# Patient Record
Sex: FEMALE | Race: OTHER | Employment: UNEMPLOYED | URBAN - METROPOLITAN AREA
[De-identification: names, ages, dates, MRNs, and addresses within clinical notes are randomized per-mention and may not be internally consistent; named-entity substitution may affect disease eponyms.]

---

## 2017-01-30 ENCOUNTER — TELEPHONE (OUTPATIENT)
Dept: INTERNAL MEDICINE CLINIC | Facility: CLINIC | Age: 57
End: 2017-01-30

## 2017-01-30 NOTE — TELEPHONE ENCOUNTER
Patient stated that has left sided lower abdominal pain on and off. Last time had the abdominal pain was 2-3 days. Not having any abdominal pain currently. No nausea. No vomiting. No diarrhea. No fevers.  Patient stated that for the past 1 month has been fe

## 2017-02-01 ENCOUNTER — OFFICE VISIT (OUTPATIENT)
Dept: INTERNAL MEDICINE CLINIC | Facility: CLINIC | Age: 57
End: 2017-02-01

## 2017-02-01 VITALS
RESPIRATION RATE: 20 BRPM | BODY MASS INDEX: 30.82 KG/M2 | DIASTOLIC BLOOD PRESSURE: 81 MMHG | HEIGHT: 65 IN | HEART RATE: 63 BPM | SYSTOLIC BLOOD PRESSURE: 121 MMHG | OXYGEN SATURATION: 99 % | TEMPERATURE: 98 F | WEIGHT: 185 LBS

## 2017-02-01 DIAGNOSIS — R10.2 PELVIC PAIN IN FEMALE: Primary | ICD-10-CM

## 2017-02-01 DIAGNOSIS — N81.11 CYSTOCELE, MIDLINE: ICD-10-CM

## 2017-02-01 PROCEDURE — 99212 OFFICE O/P EST SF 10 MIN: CPT | Performed by: INTERNAL MEDICINE

## 2017-02-01 PROCEDURE — 99214 OFFICE O/P EST MOD 30 MIN: CPT | Performed by: INTERNAL MEDICINE

## 2017-02-01 NOTE — ASSESSMENT & PLAN NOTE
Very mild cystocele midline. No uterine descent on palpation. No other palpable abnormalities. Urethra seems normal.  No other perineal abnormalities on visual examination. No intervention needed at this time. Minoo's exercises discussed.   Drink plent

## 2017-02-01 NOTE — PROGRESS NOTES
HPI:    Patient ID: Maryellen Tsang is a 55year old female. Abdominal Pain  This is a new problem. The current episode started more than 1 month ago. The problem occurs constantly.  The problem has been waxing and waning (fullness in the lower abdominal a %, not currently breastfeeding. Body mass index is 30.79 kg/(m^2).    Wt Readings from Last 6 Encounters:  02/01/17 : 185 lb (83.915 kg)  09/14/16 : 177 lb (80.287 kg)  07/25/16 : 181 lb (82.101 kg)  11/30/15 : 188 lb (85.276 kg)  02/04/15 : 181 lb 11.2 oz palpation. No other palpable abnormalities. Urethra seems normal.  No other perineal abnormalities on visual examination. No intervention needed at this time. Minoo's exercises discussed. Drink plenty of fluids and empty the bladder frequently.   Manda Santos

## 2017-02-01 NOTE — ASSESSMENT & PLAN NOTE
Left lower quadrant discomfort and fullness–no pain, cramping or change in bowel movements.   Appetite has been normal.  Weight has been normal.  Ultrasound of the uterus and ovaries has been ordered and will follow-up after completion

## 2017-02-01 NOTE — PATIENT INSTRUCTIONS
Problem List Items Addressed This Visit        Unprioritized    Cystocele, midline     Very mild cystocele midline. No uterine descent on palpation. No other palpable abnormalities.   Urethra seems normal.  No other perineal abnormalities on visual examin

## 2017-05-17 ENCOUNTER — HOSPITAL ENCOUNTER (OUTPATIENT)
Dept: ULTRASOUND IMAGING | Facility: HOSPITAL | Age: 57
Discharge: HOME | End: 2017-05-17
Attending: INTERNAL MEDICINE
Payer: COMMERCIAL

## 2017-05-17 DIAGNOSIS — R10.2 PELVIC PAIN IN FEMALE: ICD-10-CM

## 2017-05-17 PROCEDURE — 76856 US EXAM PELVIC COMPLETE: CPT | Performed by: INTERNAL MEDICINE

## 2017-05-17 PROCEDURE — 76830 TRANSVAGINAL US NON-OB: CPT | Performed by: INTERNAL MEDICINE

## 2017-06-13 ENCOUNTER — APPOINTMENT (OUTPATIENT)
Dept: LAB | Facility: HOSPITAL | Age: 57
End: 2017-06-13
Attending: INTERNAL MEDICINE
Payer: COMMERCIAL

## 2017-06-13 PROCEDURE — 83036 HEMOGLOBIN GLYCOSYLATED A1C: CPT | Performed by: INTERNAL MEDICINE

## 2017-06-13 PROCEDURE — 80053 COMPREHEN METABOLIC PANEL: CPT | Performed by: INTERNAL MEDICINE

## 2017-06-13 PROCEDURE — 80061 LIPID PANEL: CPT | Performed by: INTERNAL MEDICINE

## 2017-06-27 ENCOUNTER — TELEPHONE (OUTPATIENT)
Dept: INTERNAL MEDICINE CLINIC | Facility: CLINIC | Age: 57
End: 2017-06-27

## 2017-06-28 NOTE — TELEPHONE ENCOUNTER
Reviewed 6/13/17 lab results with pt along with Dr Elaine Lowry recommendations. Pt verbalized understanding and agreed with plan.

## 2017-06-28 NOTE — TELEPHONE ENCOUNTER
Notes Recorded by Colleen Ryan MD on 6/27/2017 at 9:23 PM CDT  Cholesterol panel looks stable. Mild diabetes–the hemoglobin A1c is at 6.1, please watch the diet for sugars and starches as well as desserts.   Kidney functions, liver functions and electrol

## 2017-10-02 ENCOUNTER — OFFICE VISIT (OUTPATIENT)
Dept: INTERNAL MEDICINE CLINIC | Facility: CLINIC | Age: 57
End: 2017-10-02

## 2017-10-02 VITALS
WEIGHT: 184 LBS | DIASTOLIC BLOOD PRESSURE: 68 MMHG | SYSTOLIC BLOOD PRESSURE: 105 MMHG | RESPIRATION RATE: 16 BRPM | BODY MASS INDEX: 30.66 KG/M2 | HEART RATE: 60 BPM | HEIGHT: 65 IN

## 2017-10-02 DIAGNOSIS — Z12.11 COLON CANCER SCREENING: ICD-10-CM

## 2017-10-02 DIAGNOSIS — K29.70 GASTRITIS WITHOUT BLEEDING, UNSPECIFIED CHRONICITY, UNSPECIFIED GASTRITIS TYPE: ICD-10-CM

## 2017-10-02 DIAGNOSIS — R73.9 ELEVATED BLOOD SUGAR: ICD-10-CM

## 2017-10-02 DIAGNOSIS — Z12.31 VISIT FOR SCREENING MAMMOGRAM: ICD-10-CM

## 2017-10-02 DIAGNOSIS — E78.5 HYPERLIPIDEMIA, UNSPECIFIED HYPERLIPIDEMIA TYPE: Primary | ICD-10-CM

## 2017-10-02 DIAGNOSIS — E55.9 VITAMIN D DEFICIENCY: ICD-10-CM

## 2017-10-02 PROBLEM — R10.2 PELVIC PAIN IN FEMALE: Status: RESOLVED | Noted: 2017-02-01 | Resolved: 2017-10-02

## 2017-10-02 PROCEDURE — 99214 OFFICE O/P EST MOD 30 MIN: CPT | Performed by: INTERNAL MEDICINE

## 2017-10-02 PROCEDURE — 90471 IMMUNIZATION ADMIN: CPT | Performed by: INTERNAL MEDICINE

## 2017-10-02 PROCEDURE — 90686 IIV4 VACC NO PRSV 0.5 ML IM: CPT | Performed by: INTERNAL MEDICINE

## 2017-10-02 PROCEDURE — 99212 OFFICE O/P EST SF 10 MIN: CPT | Performed by: INTERNAL MEDICINE

## 2017-10-02 RX ORDER — PANTOPRAZOLE SODIUM 40 MG/1
40 TABLET, DELAYED RELEASE ORAL
Qty: 30 TABLET | Refills: 0 | Status: SHIPPED | OUTPATIENT
Start: 2017-10-02 | End: 2017-12-05

## 2017-10-02 NOTE — ASSESSMENT & PLAN NOTE
Epigastric burning with reflux symptoms with eating spicy foods. Advised patient to avoid spicy foods and eat small meals. Pantoprazole 40 mg once daily for 4 weeks.   If any persistence will need gastric enterology eval.

## 2017-10-02 NOTE — PROGRESS NOTES
HPI:    Patient ID: Pura Zeng is a 65year old female. Colonoscopy,5 Years due on 06/01/2017    GI PROCEDURE     DATE:  6/15/12     PROCEDURE: Colonoscopy. SURGEON:   Darlene Cooper MD     PREOPERATIVE DIAGNOSIS:  1.  Family history of colon total) by mouth every morning before breakfast. Disp: 30 tablet Rfl: 0   aspirin (KP ASPIRIN) 81 MG Oral Tab EC Take 81 mg by mouth daily.    Disp:  Rfl:      Allergies:No Known Allergies      10/02/17  1040   BP: 105/68   Pulse: 60   Resp: 16     Body mass reflux symptoms with eating spicy foods. Advised patient to avoid spicy foods and eat small meals. Pantoprazole 40 mg once daily for 4 weeks.   If any persistence will need gastric enterology eval.         Hyperlipidemia - Primary     Lipid panel has impr

## 2017-10-02 NOTE — PATIENT INSTRUCTIONS
Problem List Items Addressed This Visit        Unprioritized    Elevated blood sugar     Hemoglobin A1c is elevated in spite of diet and exercise. Recheck labs have been ordered.          Relevant Orders    HEMOGLOBIN A1C    Gastritis     Epigastric burnin

## 2017-11-20 ENCOUNTER — LAB ENCOUNTER (OUTPATIENT)
Dept: LAB | Facility: HOSPITAL | Age: 57
End: 2017-11-20
Attending: INTERNAL MEDICINE
Payer: COMMERCIAL

## 2017-11-20 DIAGNOSIS — R73.9 ELEVATED BLOOD SUGAR: ICD-10-CM

## 2017-11-20 DIAGNOSIS — E78.5 HYPERLIPIDEMIA, UNSPECIFIED HYPERLIPIDEMIA TYPE: ICD-10-CM

## 2017-11-20 DIAGNOSIS — E55.9 VITAMIN D DEFICIENCY: ICD-10-CM

## 2017-11-20 PROCEDURE — 83036 HEMOGLOBIN GLYCOSYLATED A1C: CPT

## 2017-11-20 PROCEDURE — 80053 COMPREHEN METABOLIC PANEL: CPT

## 2017-11-20 PROCEDURE — 80061 LIPID PANEL: CPT

## 2017-11-20 PROCEDURE — 82607 VITAMIN B-12: CPT

## 2017-11-20 PROCEDURE — 82306 VITAMIN D 25 HYDROXY: CPT

## 2017-11-20 PROCEDURE — 84443 ASSAY THYROID STIM HORMONE: CPT

## 2017-11-20 PROCEDURE — 36415 COLL VENOUS BLD VENIPUNCTURE: CPT

## 2017-11-20 PROCEDURE — 85025 COMPLETE CBC W/AUTO DIFF WBC: CPT

## 2017-11-30 ENCOUNTER — TELEPHONE (OUTPATIENT)
Dept: GASTROENTEROLOGY | Facility: CLINIC | Age: 57
End: 2017-11-30

## 2017-11-30 ENCOUNTER — TELEPHONE (OUTPATIENT)
Dept: INTERNAL MEDICINE CLINIC | Facility: CLINIC | Age: 57
End: 2017-11-30

## 2017-11-30 NOTE — TELEPHONE ENCOUNTER
Last Procedure:  6/15/2012  Last Diagnosis:  Family history of colon cancer  Recalled for (years): 5 years  Sedation used previously:  IV sedation  Last Prep Used (if known):  Miralax prep  Quality of prep (if known):  Prep was excellent  Anticoagulants/Di

## 2017-11-30 NOTE — TELEPHONE ENCOUNTER
Chart, medications, and recent visits with Dr. Jeff Jolly reviewed.  Ok to schedule colonoscopy dx:  FH colon cancer, split dose miralax prep, MAC or conscious sedations

## 2017-12-05 ENCOUNTER — TELEPHONE (OUTPATIENT)
Dept: GASTROENTEROLOGY | Facility: CLINIC | Age: 57
End: 2017-12-05

## 2017-12-05 DIAGNOSIS — Z80.0 FAMILY HISTORY OF MALIGNANT NEOPLASM OF GASTROINTESTINAL TRACT: Primary | ICD-10-CM

## 2017-12-05 RX ORDER — PANTOPRAZOLE SODIUM 40 MG/1
TABLET, DELAYED RELEASE ORAL
Qty: 90 TABLET | Refills: 0 | Status: SHIPPED | OUTPATIENT
Start: 2017-12-05 | End: 2018-07-12 | Stop reason: ALTCHOICE

## 2017-12-05 NOTE — TELEPHONE ENCOUNTER
Refill Protocol Appointment Criteria  · Appointment scheduled in the past 12 months or in the next 3 months  Recent Outpatient Visits            2 months ago Hyperlipidemia, unspecified hyperlipidemia type    [facility], [facility], [address] [facility] [address], [address] [place] [address],[address]

## 2017-12-05 NOTE — TELEPHONE ENCOUNTER
Parish Zamudio RN          11/30/17 10:53 AM   Note      Last Procedure:  6/15/2012  Last Diagnosis:  Family history of colon cancer  Recalled for (years): 5 years  Sedation used previously:  IV sedation  Last Prep Used (if known):  Miralax prep  Quality

## 2017-12-07 ENCOUNTER — HOSPITAL ENCOUNTER (OUTPATIENT)
Dept: MAMMOGRAPHY | Age: 57
Discharge: HOME | End: 2017-12-07
Attending: INTERNAL MEDICINE
Payer: COMMERCIAL

## 2017-12-07 DIAGNOSIS — Z12.31 VISIT FOR SCREENING MAMMOGRAM: ICD-10-CM

## 2017-12-07 PROCEDURE — 77067 SCR MAMMO BI INCL CAD: CPT | Performed by: INTERNAL MEDICINE

## 2017-12-07 NOTE — TELEPHONE ENCOUNTER
OK to schedule colonoscopy , dx FH colon cancer, also EGD , dx : epigastric pain ( please check with patient if she still has , based on her visit with Dr. Fernando Garcia in October); Slit dose miralax or trilyte prep, IV or mac, [facility] ok.

## 2017-12-12 ENCOUNTER — TELEPHONE (OUTPATIENT)
Dept: GASTROENTEROLOGY | Facility: CLINIC | Age: 57
End: 2017-12-12

## 2017-12-12 NOTE — TELEPHONE ENCOUNTER
Scheduled for:  Colonoscopy 22400  Provider Name: Dr. Perry Rivas  Date:  1/16/18  Location:  [address] [place] [address]  Sedation:  MAC  Time:  1400 (pt is aware to arrive at 1300)   Prep:  2 day Miralax/Gatorade, mailed 12/12/17  Meds/Allergies Reconciled?:  Physician reviewed   Shelbi

## 2017-12-12 NOTE — TELEPHONE ENCOUNTER
Routed to schedulers as this was JUST scheduled today. See TE from 12/5/17 for orders to reschedule, thank you.

## 2017-12-12 NOTE — TELEPHONE ENCOUNTER
Pt cancelling cln/procedure scheduled on 1/16/18 due to  working and her not having a .  Pt requesting a cb to reschedule, pls call at:[phone]  *Pt informed about 72 hr cb

## 2017-12-12 NOTE — TELEPHONE ENCOUNTER
Pt states she is not having any further upper GI symptoms.  She does not wish to have an upper endoscopy

## 2017-12-13 ENCOUNTER — TELEPHONE (OUTPATIENT)
Dept: GASTROENTEROLOGY | Facility: CLINIC | Age: 57
End: 2017-12-13

## 2017-12-16 NOTE — TELEPHONE ENCOUNTER
Notes Recorded by Nitza Aguirre MD on 12/15/2017 at 8:18 PM CST  The vitamin D levels are quite low–please advised to start on vitamin D at 50,000 units once a week for 12 weeks, the prescription has been sent into the pharmacy  Diabetes test–hemoglobin A

## 2017-12-18 NOTE — TELEPHONE ENCOUNTER
Spoke with patient (identified name and ), results reviewed and agrees with plan.   Lab results mailed to the patient per patient request.

## 2018-01-16 ENCOUNTER — SURGERY (OUTPATIENT)
Age: 58
End: 2018-01-16

## 2018-01-16 ENCOUNTER — ANESTHESIA (OUTPATIENT)
Dept: ENDOSCOPY | Age: 58
End: 2018-01-16
Payer: COMMERCIAL

## 2018-01-16 ENCOUNTER — ANESTHESIA EVENT (OUTPATIENT)
Dept: ENDOSCOPY | Age: 58
End: 2018-01-16
Payer: COMMERCIAL

## 2018-01-16 ENCOUNTER — HOSPITAL ENCOUNTER (OUTPATIENT)
Age: 58
Setting detail: HOSPITAL OUTPATIENT SURGERY
Discharge: HOME | End: 2018-01-16
Attending: INTERNAL MEDICINE | Admitting: INTERNAL MEDICINE
Payer: COMMERCIAL

## 2018-01-16 DIAGNOSIS — Z80.0 FAMILY HISTORY OF MALIGNANT NEOPLASM OF GASTROINTESTINAL TRACT: ICD-10-CM

## 2018-01-16 PROBLEM — K64.8 INTERNAL HEMORRHOIDS: Status: ACTIVE | Noted: 2018-01-16

## 2018-01-16 PROBLEM — K57.30 DIVERTICULOSIS LARGE INTESTINE W/O PERFORATION OR ABSCESS W/O BLEEDING: Status: ACTIVE | Noted: 2018-01-16

## 2018-01-16 PROCEDURE — 45378 DIAGNOSTIC COLONOSCOPY: CPT | Performed by: INTERNAL MEDICINE

## 2018-01-16 RX ORDER — SODIUM CHLORIDE, SODIUM LACTATE, POTASSIUM CHLORIDE, CALCIUM CHLORIDE 600; 310; 30; 20 MG/100ML; MG/100ML; MG/100ML; MG/100ML
INJECTION, SOLUTION INTRAVENOUS CONTINUOUS PRN
Status: DISCONTINUED | OUTPATIENT
Start: 2018-01-16 | End: 2018-01-16 | Stop reason: SURG

## 2018-01-16 RX ORDER — LIDOCAINE HYDROCHLORIDE 10 MG/ML
INJECTION, SOLUTION EPIDURAL; INFILTRATION; INTRACAUDAL; PERINEURAL AS NEEDED
Status: DISCONTINUED | OUTPATIENT
Start: 2018-01-16 | End: 2018-01-16 | Stop reason: SURG

## 2018-01-16 RX ADMIN — LIDOCAINE HYDROCHLORIDE 25 MG: 10 INJECTION, SOLUTION EPIDURAL; INFILTRATION; INTRACAUDAL; PERINEURAL at 14:39:00

## 2018-01-16 RX ADMIN — SODIUM CHLORIDE, SODIUM LACTATE, POTASSIUM CHLORIDE, CALCIUM CHLORIDE: 600; 310; 30; 20 INJECTION, SOLUTION INTRAVENOUS at 14:38:00

## 2018-01-16 RX ADMIN — SODIUM CHLORIDE, SODIUM LACTATE, POTASSIUM CHLORIDE, CALCIUM CHLORIDE: 600; 310; 30; 20 INJECTION, SOLUTION INTRAVENOUS at 14:55:00

## 2018-01-16 NOTE — BRIEF OP NOTE
Pre-Operative Diagnosis: Family history of malignant neoplasm of gastrointestinal tract      Post-Operative Diagnosis: Diverticulosis, internal hemorrhoids     Procedure Performed:   Procedure(s):  COLONOSCOPY    Surgeon(s) and Role:     * [facility] Geoffrey

## 2018-01-16 NOTE — OPERATIVE REPORT
[facility]    PATIENT'S NAME: Kati Tatiana   ATTENDING PHYSICIAN: Charlotte Perez MD   OPERATING PHYSICIAN: Charlotte Perez MD   PATIENT ACCOUNT#:   [id number]    LOCATION:  [address],[facility] 1 ENDO POOL ROOMS 6 [facility] [address]  [id number]  [facility]/

## 2018-01-16 NOTE — H&P
History & Physical Examination    Patient Name: Arie Nunez  MRN: [id number]  [facility]: [id number]  YOB: 1960    Diagnosis: Family history of colon cancer      Prescriptions Prior to Admission:  PANTOPRAZOLE SODIUM 40 MG Oral Tab EC TAKE ONE TAB

## 2018-01-16 NOTE — ANESTHESIA PREPROCEDURE EVALUATION
Anesthesia PreOp Note    HPI:     Jimmy Lomeli is a 65year old female who presents for preoperative consultation requested by: Jean Carlos Cifuentes MD    Date of Surgery: 1/16/2018    Procedure(s):  COLONOSCOPY  Indication: Family history of malig Spouse name: N/A    Years of education: N/A  Number of children: N/A     Occupational History  None on file     Social History Main Topics   Smoking status: Never Smoker    Smokeless tobacco: Never Used    Alcohol use No    Drug use: No    Sexual activity: the patient's questions were answered to the best of my ability. The patient desires the anesthetic management as planned.   Ajith LEAL  1/16/2018 2:35 PM

## 2018-01-17 VITALS
WEIGHT: 185 LBS | DIASTOLIC BLOOD PRESSURE: 82 MMHG | SYSTOLIC BLOOD PRESSURE: 138 MMHG | HEART RATE: 56 BPM | HEIGHT: 66 IN | RESPIRATION RATE: 19 BRPM | BODY MASS INDEX: 29.73 KG/M2 | OXYGEN SATURATION: 98 %

## 2018-01-18 NOTE — TELEPHONE ENCOUNTER
Entered into EPIC:Recall colon in 5 years per . Last Colon done 1/16/18, next due 1/16/23. Snapshot updated.

## 2018-07-12 ENCOUNTER — OFFICE VISIT (OUTPATIENT)
Dept: INTERNAL MEDICINE CLINIC | Facility: CLINIC | Age: 58
End: 2018-07-12

## 2018-07-12 VITALS
SYSTOLIC BLOOD PRESSURE: 125 MMHG | DIASTOLIC BLOOD PRESSURE: 79 MMHG | TEMPERATURE: 98 F | RESPIRATION RATE: 18 BRPM | HEART RATE: 60 BPM | HEIGHT: 66 IN | BODY MASS INDEX: 29.7 KG/M2 | WEIGHT: 184.81 LBS

## 2018-07-12 DIAGNOSIS — E55.9 VITAMIN D DEFICIENCY: ICD-10-CM

## 2018-07-12 DIAGNOSIS — E78.5 HYPERLIPIDEMIA, UNSPECIFIED HYPERLIPIDEMIA TYPE: Primary | ICD-10-CM

## 2018-07-12 DIAGNOSIS — R73.9 ELEVATED BLOOD SUGAR: ICD-10-CM

## 2018-07-12 PROCEDURE — 99212 OFFICE O/P EST SF 10 MIN: CPT | Performed by: INTERNAL MEDICINE

## 2018-07-12 PROCEDURE — 99214 OFFICE O/P EST MOD 30 MIN: CPT | Performed by: INTERNAL MEDICINE

## 2018-07-12 NOTE — PROGRESS NOTES
HPI:    Patient ID: Maria Victoria Reynolds is a 65year old female.     Operative Report     Rachael Hamilton MD   Gastroenterology        [facility]       PATIENT'S NAME: Saritha Berg PHYSICIAN: Kristen Licona MD  [facility] Recent lipid tests were reviewed and are variable. Exacerbating diseases include obesity. Factors aggravating her hyperlipidemia include fatty foods. Pertinent negatives include no chest pain, myalgias or shortness of breath.  Current antihyperlipidemic lulu rhythm, normal heart sounds and intact distal pulses. No murmur heard. Pulmonary/Chest: Effort normal and breath sounds normal. She has no wheezes. She has no rales. She exhibits no tenderness. Abdominal: Soft.  Bowel sounds are normal. She exhibits n

## 2018-07-12 NOTE — ASSESSMENT & PLAN NOTE
Lipid panel and liver function tests have been stable on diet control alone. Due for recheck labs which have been ordered today.

## 2018-07-12 NOTE — PATIENT INSTRUCTIONS
Problem List Items Addressed This Visit        Unprioritized    Elevated blood sugar     Hemoglobin A1c have been stable on diet alone, she is overdue for labs–ordered today         Relevant Orders    HEMOGLOBIN A1C    Hyperlipidemia - Primary     Lipid pa

## 2018-12-06 ENCOUNTER — APPOINTMENT (OUTPATIENT)
Dept: LAB | Facility: HOSPITAL | Age: 58
End: 2018-12-06
Attending: INTERNAL MEDICINE
Payer: COMMERCIAL

## 2018-12-06 DIAGNOSIS — E55.9 VITAMIN D DEFICIENCY: ICD-10-CM

## 2018-12-06 DIAGNOSIS — E78.5 HYPERLIPIDEMIA, UNSPECIFIED HYPERLIPIDEMIA TYPE: ICD-10-CM

## 2018-12-06 DIAGNOSIS — R73.9 ELEVATED BLOOD SUGAR: ICD-10-CM

## 2018-12-06 PROCEDURE — 36415 COLL VENOUS BLD VENIPUNCTURE: CPT

## 2018-12-06 PROCEDURE — 80053 COMPREHEN METABOLIC PANEL: CPT

## 2018-12-06 PROCEDURE — 80061 LIPID PANEL: CPT

## 2018-12-06 PROCEDURE — 82306 VITAMIN D 25 HYDROXY: CPT

## 2018-12-06 PROCEDURE — 83036 HEMOGLOBIN GLYCOSYLATED A1C: CPT

## 2018-12-14 ENCOUNTER — OFFICE VISIT (OUTPATIENT)
Dept: INTERNAL MEDICINE CLINIC | Facility: CLINIC | Age: 58
End: 2018-12-14

## 2018-12-14 VITALS
WEIGHT: 184 LBS | SYSTOLIC BLOOD PRESSURE: 168 MMHG | DIASTOLIC BLOOD PRESSURE: 103 MMHG | HEIGHT: 66 IN | RESPIRATION RATE: 16 BRPM | BODY MASS INDEX: 29.57 KG/M2 | HEART RATE: 71 BPM

## 2018-12-14 DIAGNOSIS — R73.9 ELEVATED BLOOD SUGAR: ICD-10-CM

## 2018-12-14 DIAGNOSIS — Z23 NEED FOR VACCINATION: ICD-10-CM

## 2018-12-14 DIAGNOSIS — E55.9 VITAMIN D DEFICIENCY: ICD-10-CM

## 2018-12-14 DIAGNOSIS — Z87.898 HISTORY OF SEIZURES: ICD-10-CM

## 2018-12-14 DIAGNOSIS — Z00.00 ROUTINE GENERAL MEDICAL EXAMINATION AT A HEALTH CARE FACILITY: Primary | ICD-10-CM

## 2018-12-14 DIAGNOSIS — Z12.31 VISIT FOR SCREENING MAMMOGRAM: ICD-10-CM

## 2018-12-14 DIAGNOSIS — R55 SYNCOPE, UNSPECIFIED SYNCOPE TYPE: ICD-10-CM

## 2018-12-14 DIAGNOSIS — Z78.0 MENOPAUSE: ICD-10-CM

## 2018-12-14 DIAGNOSIS — E78.5 HYPERLIPIDEMIA, UNSPECIFIED HYPERLIPIDEMIA TYPE: ICD-10-CM

## 2018-12-14 PROCEDURE — 99212 OFFICE O/P EST SF 10 MIN: CPT | Performed by: INTERNAL MEDICINE

## 2018-12-14 PROCEDURE — 90471 IMMUNIZATION ADMIN: CPT | Performed by: INTERNAL MEDICINE

## 2018-12-14 PROCEDURE — 99396 PREV VISIT EST AGE 40-64: CPT | Performed by: INTERNAL MEDICINE

## 2018-12-14 PROCEDURE — 90686 IIV4 VACC NO PRSV 0.5 ML IM: CPT | Performed by: INTERNAL MEDICINE

## 2018-12-14 PROCEDURE — 99213 OFFICE O/P EST LOW 20 MIN: CPT | Performed by: INTERNAL MEDICINE

## 2018-12-14 RX ORDER — ERGOCALCIFEROL 1.25 MG/1
50000 CAPSULE ORAL WEEKLY
Qty: 12 CAPSULE | Refills: 1 | Status: SHIPPED | OUTPATIENT
Start: 2018-12-14 | End: 2019-01-13

## 2018-12-14 RX ORDER — AMLODIPINE BESYLATE 2.5 MG/1
2.5 TABLET ORAL DAILY
Qty: 30 TABLET | Refills: 2 | Status: SHIPPED | OUTPATIENT
Start: 2018-12-14 | End: 2019-01-04 | Stop reason: ALTCHOICE

## 2018-12-14 RX ORDER — ATORVASTATIN CALCIUM 10 MG/1
10 TABLET, FILM COATED ORAL NIGHTLY
Qty: 30 TABLET | Refills: 3 | Status: SHIPPED | OUTPATIENT
Start: 2018-12-14 | End: 2019-02-20 | Stop reason: ALTCHOICE

## 2018-12-14 NOTE — PATIENT INSTRUCTIONS
Problem List Items Addressed This Visit        Unprioritized    Elevated blood sugar     Gradually rising blood sugars as well as hemoglobin A1c. Strict diet control as directed.   Will reassess labs within next blood draw          Relevant Orders    HEMOG today.         Syncopal episodes     2 recent episodes of syncope. First when she had an episode of diarrhea and vomiting which most likely was related to dehydration.   The second which happened recently was not associated with any pain, palpitations or s

## 2018-12-14 NOTE — PROGRESS NOTES
REASON FOR VISIT:    Alma Thomas is a 63year old female who presents for an [address].     Pap Smear,3 Years due on 2019  Mammogram due on 2018    Obstetric History     T0    L0    SAB0  TAB0  Ectopic0  Multiple0  L sugar     Gastritis     Diverticulosis large intestine w/o perforation or abscess w/o bleeding     Internal hemorrhoids     Syncopal episodes     History of seizures      General Health             Domestic Abuse: No     CAGE:      Depression Screening (Lanny Geller if high risk No components found for: PPDINDURAT      Disease Monitoring:    SPECIFIC DISEASE MONITORING Internal Lab or Procedure External Lab or Procedure   Annual Monitoring of Persistent     Medications (ACE/ARB, digoxin, diuretics)    Potassium  Annua OU   • Epiphora, bilateral 2012    OD-total blockage, OS partial blockage   • Hemorrhoids    • Internal hemorrhoids 1/16/2018   • MGD (meibomian gland dysfunction) 2012      Past Surgical History:   Procedure Laterality Date   • COLONOSCOPY  2012   • COLON auscultation  CARDIO: RRR without murmur  GI: good BS's, no masses, HSM or tenderness  : introitus is normal, scant discharge, cervix is pink, no adnexal masses or tenderness,   RECTAL: good rectal tone, stool is OB negative  MUSCULOSKELETAL: back is not as external hemorrhoids–small present  [employer] negetive  Pelvic exam completed–no cervical movement tenderness, adnexal palpable abnormalities. No cystocele, rectocele or uterine descent.   Pap Smear,3 Years due on 09/14/2019    Immunizations- (Completed)            PLAN SUMMARY:   Diagnoses and all orders for this visit:    Routine general medical examination at a health care facility    Vitamin D deficiency    Hyperlipidemia, unspecified hyperlipidemia type    Elevated blood sugar  -     Cance

## 2018-12-14 NOTE — ASSESSMENT & PLAN NOTE
Gradually rising blood sugars as well as hemoglobin A1c. Strict diet control as directed.   Will reassess labs within next blood draw

## 2018-12-14 NOTE — ASSESSMENT & PLAN NOTE
Normal exam.  Labs as ordered. Skin check normal.  No significant abnormal nevi. Breast exam completed–no palpable abnormalities, discharge from the nipples or axillary adenopathy.   Mammogram due on 12/07/2018  dexa scan due  No cervical or inguinal lymp

## 2018-12-14 NOTE — ASSESSMENT & PLAN NOTE
Lipid panel shows significant elevation in the LDL cholesterol. Will start on atorvastatin at 10 mg 1 tablet daily. She is advised to watch her diet carefully, will recheck labs in about 6-8 weeks.

## 2018-12-14 NOTE — ASSESSMENT & PLAN NOTE
2 recent episodes of syncope. First when she had an episode of diarrhea and vomiting which most likely was related to dehydration. The second which happened recently was not associated with any pain, palpitations or shortness of breath.   Patient felt diz

## 2018-12-14 NOTE — ASSESSMENT & PLAN NOTE
Old EKG records requested. This was done in [facility] and hence she will bring in her records at her next office visit. Repeat EEG has been ordered at this time to evaluate if this is the cause for her current syncopal episode.   May need a follow-up with cesar

## 2018-12-14 NOTE — ASSESSMENT & PLAN NOTE
Restart on vitamin D at 50,000 units once a week for the next 6 months. Prescription has been provided.

## 2018-12-17 ENCOUNTER — HOSPITAL ENCOUNTER (OUTPATIENT)
Dept: CV DIAGNOSTICS | Facility: HOSPITAL | Age: 58
Discharge: HOME | End: 2018-12-17
Attending: INTERNAL MEDICINE
Payer: COMMERCIAL

## 2018-12-17 DIAGNOSIS — R55 SYNCOPE, UNSPECIFIED SYNCOPE TYPE: ICD-10-CM

## 2018-12-17 PROCEDURE — 93017 CV STRESS TEST TRACING ONLY: CPT | Performed by: INTERNAL MEDICINE

## 2018-12-17 PROCEDURE — 93018 CV STRESS TEST I&R ONLY: CPT | Performed by: INTERNAL MEDICINE

## 2018-12-22 ENCOUNTER — HOSPITAL ENCOUNTER (OUTPATIENT)
Dept: BONE DENSITY | Age: 58
Discharge: HOME | End: 2018-12-22
Attending: INTERNAL MEDICINE
Payer: COMMERCIAL

## 2018-12-22 ENCOUNTER — HOSPITAL ENCOUNTER (OUTPATIENT)
Dept: MAMMOGRAPHY | Age: 58
Discharge: HOME | End: 2018-12-22
Attending: INTERNAL MEDICINE
Payer: COMMERCIAL

## 2018-12-22 DIAGNOSIS — Z12.31 VISIT FOR SCREENING MAMMOGRAM: ICD-10-CM

## 2018-12-22 DIAGNOSIS — Z78.0 MENOPAUSE: ICD-10-CM

## 2018-12-22 PROCEDURE — 77080 DXA BONE DENSITY AXIAL: CPT | Performed by: INTERNAL MEDICINE

## 2018-12-22 PROCEDURE — 77067 SCR MAMMO BI INCL CAD: CPT | Performed by: INTERNAL MEDICINE

## 2019-01-02 ENCOUNTER — NURSE ONLY (OUTPATIENT)
Dept: ELECTROPHYSIOLOGY | Facility: HOSPITAL | Age: 59
End: 2019-01-02
Attending: INTERNAL MEDICINE
Payer: COMMERCIAL

## 2019-01-02 DIAGNOSIS — R55 SYNCOPE, UNSPECIFIED SYNCOPE TYPE: ICD-10-CM

## 2019-01-02 DIAGNOSIS — Z87.898 HISTORY OF SEIZURES: ICD-10-CM

## 2019-01-02 PROCEDURE — 95816 EEG AWAKE AND DROWSY: CPT | Performed by: OTHER

## 2019-01-02 NOTE — PROCEDURES
[address] [place] [address], [address] [address]      PATIENT'S NAME: Gema Ray   ATTENDING PHYSICIAN: Jm Lainez M.D.    PATIENT ACCOUNT #: [de-identified] [place] [facility]   MEDICAL RECORD #: [id number] DATE OF BIRTH: [birth year]

## 2019-01-03 ENCOUNTER — HOSPITAL ENCOUNTER (OUTPATIENT)
Dept: CV DIAGNOSTICS | Facility: HOSPITAL | Age: 59
Discharge: HOME | End: 2019-01-03
Attending: INTERNAL MEDICINE
Payer: COMMERCIAL

## 2019-01-03 DIAGNOSIS — R55 SYNCOPE, UNSPECIFIED SYNCOPE TYPE: ICD-10-CM

## 2019-01-03 PROCEDURE — 93306 TTE W/DOPPLER COMPLETE: CPT | Performed by: INTERNAL MEDICINE

## 2019-01-04 ENCOUNTER — OFFICE VISIT (OUTPATIENT)
Dept: INTERNAL MEDICINE CLINIC | Facility: CLINIC | Age: 59
End: 2019-01-04

## 2019-01-04 VITALS
RESPIRATION RATE: 16 BRPM | BODY MASS INDEX: 30.37 KG/M2 | HEIGHT: 66 IN | HEART RATE: 73 BPM | SYSTOLIC BLOOD PRESSURE: 140 MMHG | DIASTOLIC BLOOD PRESSURE: 90 MMHG | WEIGHT: 189 LBS

## 2019-01-04 DIAGNOSIS — I10 ESSENTIAL HYPERTENSION: ICD-10-CM

## 2019-01-04 DIAGNOSIS — R55 SYNCOPE, UNSPECIFIED SYNCOPE TYPE: Primary | ICD-10-CM

## 2019-01-04 PROCEDURE — 99212 OFFICE O/P EST SF 10 MIN: CPT | Performed by: INTERNAL MEDICINE

## 2019-01-04 PROCEDURE — 99214 OFFICE O/P EST MOD 30 MIN: CPT | Performed by: INTERNAL MEDICINE

## 2019-01-04 RX ORDER — LOSARTAN POTASSIUM 50 MG/1
TABLET ORAL
Qty: 30 TABLET | Refills: 3 | Status: SHIPPED | OUTPATIENT
Start: 2019-01-04 | End: 2019-07-24

## 2019-01-04 NOTE — ASSESSMENT & PLAN NOTE
Blood pressure 140/90, pulse 73, resp. rate 16, height 5' 6\" (1.676 m), weight 189 lb (85.7 kg), not currently breastfeeding. She remains elevated even upon recheck.   Patient was started on amlodipine at 2.5 mg daily–advised to discontinue and start on

## 2019-01-04 NOTE — PROGRESS NOTES
HPI:    Patient ID: Alma Thomas is a 63year old female.    eeg 1/2/2018      INTERPRETATION:  Recording revealed background activity of 8-9 Hz.  Amplitude of 20-50 microvolts was seen in bilateral hemispheres.  Background activity was evenly distributed Respiratory: Negative. Negative for shortness of breath. Cardiovascular: Negative. Negative for palpitations. Gastrointestinal: Negative. Endocrine: Negative. Genitourinary: Negative. Musculoskeletal: Negative. Skin: Negative.     Adilene Gun Lymphadenopathy:     She has no cervical adenopathy. Neurological: She is alert and oriented to person, place, and time. She has normal reflexes. No cranial nerve deficit. She exhibits normal muscle tone. Coordination normal.   Skin: No rash noted.  No advised to return for a follow-up evaluation in 8 weeks. Relevant Medications    Losartan Potassium 50 MG Oral Tab    Other Relevant Orders    COMP METABOLIC PANEL (14)          Return in about 2 months (around 3/4/2019).     PT UNDERSTANDS AND AGRE

## 2019-01-04 NOTE — ASSESSMENT & PLAN NOTE
Patient had 2 recent episodes of syncope. The first episode was when she had an episode of diarrhea and vomiting and dehydrated and tired. The second episode happened without any significant dehydration but patient was quite tired on the day.   She was si

## 2019-01-04 NOTE — PATIENT INSTRUCTIONS
Problem List Items Addressed This Visit        Unprioritized    Essential hypertension     Blood pressure 140/90, pulse 73, resp. rate 16, height 5' 6\" (1.676 m), weight 189 lb (85.7 kg), not currently breastfeeding.     She remains elevated even upon rech

## 2019-02-20 ENCOUNTER — OFFICE VISIT (OUTPATIENT)
Dept: NEUROLOGY | Facility: CLINIC | Age: 59
End: 2019-02-20

## 2019-02-20 VITALS
HEART RATE: 78 BPM | SYSTOLIC BLOOD PRESSURE: 128 MMHG | BODY MASS INDEX: 31 KG/M2 | WEIGHT: 189 LBS | RESPIRATION RATE: 18 BRPM | DIASTOLIC BLOOD PRESSURE: 70 MMHG

## 2019-02-20 DIAGNOSIS — R56.9 SEIZURE-LIKE ACTIVITY (HCC): Primary | ICD-10-CM

## 2019-02-20 DIAGNOSIS — R55 SYNCOPE, UNSPECIFIED SYNCOPE TYPE: ICD-10-CM

## 2019-02-20 PROCEDURE — 99244 OFF/OP CNSLTJ NEW/EST MOD 40: CPT | Performed by: OTHER

## 2019-02-20 RX ORDER — ERGOCALCIFEROL 1.25 MG/1
50000 CAPSULE ORAL WEEKLY
COMMUNITY
End: 2019-03-05

## 2019-02-20 NOTE — PROGRESS NOTES
HPI:    Patient ID: Josue Bailey is a 63year old female. PCP: Dr Jaci Grove    Thank you for requesting this consultation to us. Below is the summary of my evaluation    HPI   Josue Bailey is a 63year old female who presents for evaluation of syncope.  She unknown cause of death   • Diabetes Mother       Social History    Tobacco Use      Smoking status: Never Smoker      Smokeless tobacco: Never Used    Alcohol use: No      Alcohol/week: 0.0 oz    Drug use:  No             Review of Systems   Constitutio palate elevation. Uvula in midline. XI: Normal sternocleidomastoid and trapezius strength. XII: Tongue is in midline with normal lateral movements.   Sensory : Intact to all modalities including light touch, pinprick, vibration and proprioception  Motor

## 2019-02-20 NOTE — PROGRESS NOTES
.The patient is here for syncope. The patient states she had two episodes last year. The patient most recent episode was in October 2018.  The patient had an EEG on 01/09/19

## 2019-03-02 ENCOUNTER — APPOINTMENT (OUTPATIENT)
Dept: LAB | Facility: HOSPITAL | Age: 59
End: 2019-03-02
Attending: INTERNAL MEDICINE
Payer: COMMERCIAL

## 2019-03-02 DIAGNOSIS — I10 ESSENTIAL HYPERTENSION: ICD-10-CM

## 2019-03-02 LAB
ALBUMIN SERPL-MCNC: 3.9 G/DL (ref 3.4–5)
ALBUMIN/GLOB SERPL: 1 {RATIO} (ref 1–2)
ALP LIVER SERPL-CCNC: 74 U/L (ref 46–118)
ALT SERPL-CCNC: 29 U/L (ref 13–56)
ANION GAP SERPL CALC-SCNC: 7 MMOL/L (ref 0–18)
AST SERPL-CCNC: 14 U/L (ref 15–37)
BILIRUB SERPL-MCNC: 0.4 MG/DL (ref 0.1–2)
BUN BLD-MCNC: 11 MG/DL (ref 7–18)
BUN/CREAT SERPL: 16.4 (ref 10–20)
CALCIUM BLD-MCNC: 9.4 MG/DL (ref 8.5–10.1)
CHLORIDE SERPL-SCNC: 107 MMOL/L (ref 98–107)
CO2 SERPL-SCNC: 28 MMOL/L (ref 21–32)
CREAT BLD-MCNC: 0.67 MG/DL (ref 0.55–1.02)
GLOBULIN PLAS-MCNC: 3.9 G/DL (ref 2.8–4.4)
GLUCOSE BLD-MCNC: 106 MG/DL (ref 70–99)
M PROTEIN MFR SERPL ELPH: 7.8 G/DL (ref 6.4–8.2)
OSMOLALITY SERPL CALC.SUM OF ELEC: 294 MOSM/KG (ref 275–295)
POTASSIUM SERPL-SCNC: 3.7 MMOL/L (ref 3.5–5.1)
SODIUM SERPL-SCNC: 142 MMOL/L (ref 136–145)

## 2019-03-02 PROCEDURE — 80053 COMPREHEN METABOLIC PANEL: CPT

## 2019-03-02 PROCEDURE — 36415 COLL VENOUS BLD VENIPUNCTURE: CPT

## 2019-03-05 ENCOUNTER — OFFICE VISIT (OUTPATIENT)
Dept: INTERNAL MEDICINE CLINIC | Facility: CLINIC | Age: 59
End: 2019-03-05

## 2019-03-05 VITALS
DIASTOLIC BLOOD PRESSURE: 84 MMHG | SYSTOLIC BLOOD PRESSURE: 146 MMHG | BODY MASS INDEX: 30.22 KG/M2 | RESPIRATION RATE: 16 BRPM | WEIGHT: 188 LBS | HEIGHT: 66 IN | HEART RATE: 59 BPM

## 2019-03-05 DIAGNOSIS — E55.9 VITAMIN D DEFICIENCY: ICD-10-CM

## 2019-03-05 DIAGNOSIS — E78.5 HYPERLIPIDEMIA, UNSPECIFIED HYPERLIPIDEMIA TYPE: ICD-10-CM

## 2019-03-05 DIAGNOSIS — I10 ESSENTIAL HYPERTENSION: Primary | ICD-10-CM

## 2019-03-05 PROCEDURE — 99212 OFFICE O/P EST SF 10 MIN: CPT | Performed by: INTERNAL MEDICINE

## 2019-03-05 PROCEDURE — 99214 OFFICE O/P EST MOD 30 MIN: CPT | Performed by: INTERNAL MEDICINE

## 2019-03-05 RX ORDER — AMLODIPINE BESYLATE 2.5 MG/1
2.5 TABLET ORAL DAILY
Qty: 30 TABLET | Refills: 5 | Status: SHIPPED | OUTPATIENT
Start: 2019-03-05 | End: 2019-10-23

## 2019-03-05 RX ORDER — ERGOCALCIFEROL 1.25 MG/1
50000 CAPSULE ORAL WEEKLY
Qty: 12 CAPSULE | Refills: 0 | Status: SHIPPED | OUTPATIENT
Start: 2019-03-05 | End: 2020-09-07

## 2019-03-05 NOTE — PROGRESS NOTES
HPI:    Patient ID: Errol Bishop is a 63year old female.     R56.9) Seizure-like activity ([facility] [address]  (primary encounter diagnosis)  Plan: MRI BRAIN (W+WO) (CPT=70553)     (R55) Syncope, unspecified syncope type  Plan: MRI BRAIN (W+WO) (CPT=70553)     Suspecti exercise and adherence to diet. Risk factors for coronary artery disease include dyslipidemia, a sedentary lifestyle, post-menopausal and obesity. Review of Systems   Constitutional: Negative. HENT: Negative. Eyes: Negative.     Respiratory: Ne Musculoskeletal: Normal range of motion. She exhibits no edema or tenderness. Lymphadenopathy:     She has no cervical adenopathy. Neurological: She is alert and oriented to person, place, and time. She has normal reflexes.  She displays normal reflex [E]      Meds This Visit:  Requested Prescriptions     Signed Prescriptions Disp Refills   • ergocalciferol 11691 units Oral Cap 12 capsule 0     Sig: Take 1 capsule (50,000 Units total) by mouth once a week.    • amLODIPine Besylate 2.5 MG Oral Tab 30 tabl

## 2019-03-05 NOTE — ASSESSMENT & PLAN NOTE
Blood pressure 146/84, pulse 59, resp. rate 16, height 5' 6\" (1.676 m), weight 188 lb (85.3 kg), not currently breastfeeding. Pressure remains quite elevated even upon recheck. She has been on losartan at 50 mg once daily.   She is advised to restart

## 2019-03-05 NOTE — ASSESSMENT & PLAN NOTE
Patient was started on atorvastatin at 10 mg daily. She has discontinued the medication as she was worried about the side effects. She did not have any significant side effects on the medication however. She would prefer to stay on diet control alone.

## 2019-03-05 NOTE — PATIENT INSTRUCTIONS
Problem List Items Addressed This Visit        Unprioritized    Essential hypertension - Primary     Blood pressure 146/84, pulse 59, resp. rate 16, height 5' 6\" (1.676 m), weight 188 lb (85.3 kg), not currently breastfeeding.      Pressure remains quite e

## 2019-03-15 ENCOUNTER — TELEPHONE (OUTPATIENT)
Dept: INTERNAL MEDICINE CLINIC | Facility: CLINIC | Age: 59
End: 2019-03-15

## 2019-03-15 NOTE — TELEPHONE ENCOUNTER
Pt states pharmacy advised meds below has been recalled and req an alternative meds.     •  Losartan Potassium 50 MG Oral Tab, 1 tab po q d, Disp: 30 tablet, Rfl: 3

## 2019-03-16 NOTE — TELEPHONE ENCOUNTER
Patient has 2 alternatives.  losartan potassium from a different  as the medication has not been recalled from all manufacturers. Change to valsartan 80 mg 1 tablet daily– 30 tablets and 3 refills.   If this is not covered–please have

## 2019-03-18 RX ORDER — VALSARTAN 80 MG/1
80 TABLET ORAL DAILY
Qty: 30 TABLET | Refills: 0 | Status: SHIPPED | OUTPATIENT
Start: 2019-03-18 | End: 2019-06-12

## 2019-03-18 RX ORDER — LOSARTAN POTASSIUM 25 MG/1
50 TABLET ORAL DAILY
Qty: 60 TABLET | Refills: 3 | Status: SHIPPED | OUTPATIENT
Start: 2019-03-18 | End: 2019-06-12

## 2019-03-18 NOTE — TELEPHONE ENCOUNTER
Relayed Dr message, Pt requesting new rx- rx sent, called pharmacy -  Stated rx is on back order but can get Losartan 25 mg 2 daily  from another manufacture  Called Pt to make aware, verbalized understanding, rx sent

## 2019-03-26 ENCOUNTER — TELEPHONE (OUTPATIENT)
Dept: NEUROLOGY | Facility: CLINIC | Age: 59
End: 2019-03-26

## 2019-04-08 ENCOUNTER — HOSPITAL ENCOUNTER (OUTPATIENT)
Dept: MRI IMAGING | Facility: HOSPITAL | Age: 59
Discharge: HOME | End: 2019-04-08
Attending: Other
Payer: COMMERCIAL

## 2019-04-08 DIAGNOSIS — R55 SYNCOPE, UNSPECIFIED SYNCOPE TYPE: ICD-10-CM

## 2019-04-08 DIAGNOSIS — R56.9 SEIZURE-LIKE ACTIVITY (HCC): ICD-10-CM

## 2019-04-08 PROCEDURE — 70553 MRI BRAIN STEM W/O & W/DYE: CPT | Performed by: OTHER

## 2019-04-08 PROCEDURE — A9575 INJ GADOTERATE MEGLUMI 0.1ML: HCPCS | Performed by: OTHER

## 2019-04-09 ENCOUNTER — TELEPHONE (OUTPATIENT)
Dept: NEUROLOGY | Facility: CLINIC | Age: 59
End: 2019-04-09

## 2019-04-09 NOTE — TELEPHONE ENCOUNTER
Patient informed of below. Per Dr Markie Hackett note, she wanted patient to follow up after testing. Patient states she will call back tomorrow to schedule as she has to check with family's availability to bring her to appointment.

## 2019-06-12 ENCOUNTER — OFFICE VISIT (OUTPATIENT)
Dept: NEUROLOGY | Facility: CLINIC | Age: 59
End: 2019-06-12

## 2019-06-12 VITALS
WEIGHT: 184 LBS | BODY MASS INDEX: 30 KG/M2 | HEART RATE: 68 BPM | RESPIRATION RATE: 16 BRPM | DIASTOLIC BLOOD PRESSURE: 74 MMHG | SYSTOLIC BLOOD PRESSURE: 104 MMHG

## 2019-06-12 DIAGNOSIS — R55 SYNCOPE, UNSPECIFIED SYNCOPE TYPE: ICD-10-CM

## 2019-06-12 DIAGNOSIS — R56.9 SEIZURE-LIKE ACTIVITY (HCC): Primary | ICD-10-CM

## 2019-06-12 PROCEDURE — 99213 OFFICE O/P EST LOW 20 MIN: CPT | Performed by: OTHER

## 2019-06-12 NOTE — PROGRESS NOTES
Pt here to discuss recent MRI brain results after experiencing some seizure-like activity. Pt denies any seizure-like activity since last visit.

## 2019-06-12 NOTE — PROGRESS NOTES
HPI:    Patient ID: Arie Nunez is a 63year old female. PCP: Dr Gupta Friend      HPI     Patient presents for follow up for possible complex partial seizures. No further clinical spells or syncope.  She completed MRI brain w and w/o contrast and it was negat Problem Relation Age of Onset   • Colon Cancer Father 68   • Stroke Father 66   • Other (Other) Father 80        unknown cause of death   • Diabetes Mother       Social History    Tobacco Use      Smoking status: Never Smoker      Smokeless tobacco: Ilda Patel to light  and accommodation bilaterally. Extraocular muscle intact. Visual fields intact. V: Normal facial sensation   VII: Face is symmetric with normal strength. VIII: Normal hearing bilaterally. IX, X: Symmetric palate elevation.  Uvula in midlin

## 2019-07-24 RX ORDER — LOSARTAN POTASSIUM 25 MG/1
TABLET ORAL
Qty: 180 TABLET | Refills: 1 | Status: SHIPPED | OUTPATIENT
Start: 2019-07-24 | End: 2019-10-23

## 2019-07-24 NOTE — TELEPHONE ENCOUNTER
Refill passed per [facility] protocol.   Hypertensive Medications  Protocol Criteria:  · Appointment scheduled in the past 6 months or in the next 3 months  · BMP or CMP in the past 12 months  · Creatinine result < 2  Recent Outpatient Visits

## 2019-08-03 ENCOUNTER — LAB ENCOUNTER (OUTPATIENT)
Dept: LAB | Facility: HOSPITAL | Age: 59
End: 2019-08-03
Attending: INTERNAL MEDICINE
Payer: COMMERCIAL

## 2019-08-03 DIAGNOSIS — I10 ESSENTIAL HYPERTENSION: ICD-10-CM

## 2019-08-03 DIAGNOSIS — E55.9 VITAMIN D DEFICIENCY: ICD-10-CM

## 2019-08-03 LAB
ALBUMIN SERPL-MCNC: 3.8 G/DL (ref 3.4–5)
ALBUMIN/GLOB SERPL: 1 {RATIO} (ref 1–2)
ALP LIVER SERPL-CCNC: 68 U/L (ref 46–118)
ALT SERPL-CCNC: 20 U/L (ref 13–56)
ANION GAP SERPL CALC-SCNC: 4 MMOL/L (ref 0–18)
AST SERPL-CCNC: 12 U/L (ref 15–37)
BASOPHILS # BLD AUTO: 0.01 X10(3) UL (ref 0–0.2)
BASOPHILS NFR BLD AUTO: 0.2 %
BILIRUB SERPL-MCNC: 0.4 MG/DL (ref 0.1–2)
BILIRUB UR QL STRIP.AUTO: NEGATIVE
BUN BLD-MCNC: 9 MG/DL (ref 7–18)
BUN/CREAT SERPL: 12.7 (ref 10–20)
CALCIUM BLD-MCNC: 9.4 MG/DL (ref 8.5–10.1)
CHLORIDE SERPL-SCNC: 108 MMOL/L (ref 98–112)
CHOLEST SMN-MCNC: 170 MG/DL (ref ?–200)
CO2 SERPL-SCNC: 28 MMOL/L (ref 21–32)
COLOR UR AUTO: YELLOW
CREAT BLD-MCNC: 0.71 MG/DL (ref 0.55–1.02)
DEPRECATED RDW RBC AUTO: 39.6 FL (ref 35.1–46.3)
EOSINOPHIL # BLD AUTO: 0.04 X10(3) UL (ref 0–0.7)
EOSINOPHIL NFR BLD AUTO: 0.8 %
ERYTHROCYTE [DISTWIDTH] IN BLOOD BY AUTOMATED COUNT: 12.7 % (ref 11–15)
GLOBULIN PLAS-MCNC: 3.7 G/DL (ref 2.8–4.4)
GLUCOSE BLD-MCNC: 92 MG/DL (ref 70–99)
GLUCOSE UR STRIP.AUTO-MCNC: NEGATIVE MG/DL
HCT VFR BLD AUTO: 36.1 % (ref 35–48)
HDLC SERPL-MCNC: 43 MG/DL (ref 40–59)
HGB BLD-MCNC: 11.4 G/DL (ref 12–16)
IMM GRANULOCYTES # BLD AUTO: 0.01 X10(3) UL (ref 0–1)
IMM GRANULOCYTES NFR BLD: 0.2 %
KETONES UR STRIP.AUTO-MCNC: NEGATIVE MG/DL
LDLC SERPL CALC-MCNC: 103 MG/DL (ref ?–100)
LYMPHOCYTES # BLD AUTO: 2.66 X10(3) UL (ref 1–4)
LYMPHOCYTES NFR BLD AUTO: 51.3 %
M PROTEIN MFR SERPL ELPH: 7.5 G/DL (ref 6.4–8.2)
MCH RBC QN AUTO: 27.1 PG (ref 26–34)
MCHC RBC AUTO-ENTMCNC: 31.6 G/DL (ref 31–37)
MCV RBC AUTO: 86 FL (ref 80–100)
MONOCYTES # BLD AUTO: 0.28 X10(3) UL (ref 0.1–1)
MONOCYTES NFR BLD AUTO: 5.4 %
NEUTROPHILS # BLD AUTO: 2.19 X10 (3) UL (ref 1.5–7.7)
NEUTROPHILS # BLD AUTO: 2.19 X10(3) UL (ref 1.5–7.7)
NEUTROPHILS NFR BLD AUTO: 42.1 %
NITRITE UR QL STRIP.AUTO: NEGATIVE
NONHDLC SERPL-MCNC: 127 MG/DL (ref ?–130)
OSMOLALITY SERPL CALC.SUM OF ELEC: 288 MOSM/KG (ref 275–295)
PH UR STRIP.AUTO: 7 [PH] (ref 4.5–8)
PLATELET # BLD AUTO: 195 10(3)UL (ref 150–450)
POTASSIUM SERPL-SCNC: 4.2 MMOL/L (ref 3.5–5.1)
PROT UR STRIP.AUTO-MCNC: NEGATIVE MG/DL
RBC # BLD AUTO: 4.2 X10(6)UL (ref 3.8–5.3)
RBC UR QL AUTO: NEGATIVE
SODIUM SERPL-SCNC: 140 MMOL/L (ref 136–145)
SP GR UR STRIP.AUTO: 1.01 (ref 1–1.03)
TRIGL SERPL-MCNC: 118 MG/DL (ref 30–149)
UROBILINOGEN UR STRIP.AUTO-MCNC: <2 MG/DL
VIT D+METAB SERPL-MCNC: 34.2 NG/ML (ref 30–100)
VLDLC SERPL CALC-MCNC: 24 MG/DL (ref 0–30)
WBC # BLD AUTO: 5.2 X10(3) UL (ref 4–11)

## 2019-08-03 PROCEDURE — 85025 COMPLETE CBC W/AUTO DIFF WBC: CPT

## 2019-08-03 PROCEDURE — 82306 VITAMIN D 25 HYDROXY: CPT

## 2019-08-03 PROCEDURE — 80061 LIPID PANEL: CPT

## 2019-08-03 PROCEDURE — 81001 URINALYSIS AUTO W/SCOPE: CPT

## 2019-08-03 PROCEDURE — 80053 COMPREHEN METABOLIC PANEL: CPT

## 2019-08-03 PROCEDURE — 36415 COLL VENOUS BLD VENIPUNCTURE: CPT

## 2019-08-07 ENCOUNTER — OFFICE VISIT (OUTPATIENT)
Dept: INTERNAL MEDICINE CLINIC | Facility: CLINIC | Age: 59
End: 2019-08-07

## 2019-08-07 VITALS
DIASTOLIC BLOOD PRESSURE: 82 MMHG | BODY MASS INDEX: 29.62 KG/M2 | HEIGHT: 66 IN | SYSTOLIC BLOOD PRESSURE: 136 MMHG | WEIGHT: 184.31 LBS | HEART RATE: 70 BPM | RESPIRATION RATE: 20 BRPM | TEMPERATURE: 98 F

## 2019-08-07 DIAGNOSIS — D64.9 ANEMIA, UNSPECIFIED TYPE: ICD-10-CM

## 2019-08-07 DIAGNOSIS — I10 ESSENTIAL HYPERTENSION: Primary | ICD-10-CM

## 2019-08-07 DIAGNOSIS — E55.9 VITAMIN D DEFICIENCY: ICD-10-CM

## 2019-08-07 DIAGNOSIS — E78.5 HYPERLIPIDEMIA, UNSPECIFIED HYPERLIPIDEMIA TYPE: ICD-10-CM

## 2019-08-07 DIAGNOSIS — R55 SYNCOPE, UNSPECIFIED SYNCOPE TYPE: ICD-10-CM

## 2019-08-07 PROCEDURE — 99214 OFFICE O/P EST MOD 30 MIN: CPT | Performed by: INTERNAL MEDICINE

## 2019-08-07 NOTE — PROGRESS NOTES
HPI:    Patient ID: Alpesh Balderrama is a 63year old female.     Notes recorded by Dona Bae MD on 8/4/2019 at 10:30 AM CDT  Blood counts show mild anemia-which need to recheck the blood counts in about 6 to 8 weeks-orders will be placed, you can have th amlodipine last visit,tolerated well.(pressures seem to fluctuate at home) since onset. The problem is uncontrolled. Associated symptoms include anxiety and peripheral edema. There are no associated agents to hypertension.  Risk factors for coronary artery normal. Right eye exhibits no discharge. Left eye exhibits no discharge. Neck: Normal range of motion. Neck supple. No JVD present. No thyromegaly present. Cardiovascular: Normal rate, regular rhythm, normal heart sounds and intact distal pulses.    No advised not to drive until cleared by neurology. Caution with climbing stairs and ladders. Avoid swimming. Essential hypertension - Primary     Blood pressure 136/82, pulse 70, temperature 98.3 °F (36.8 °C), temperature source Oral, resp.  rate 20

## 2019-08-07 NOTE — PATIENT INSTRUCTIONS
Problem List Items Addressed This Visit        Unprioritized    Anemia     Newly diagnosed anemia–hemoglobin at 11.4. Colonoscopy due on 01/16/2023  Colonoscopy is up-to-date.   She has not noticed any blood in her stools, black stools, constipation or any has been on vitamin D supplements 50,000 units once a week. She has tolerated it well. Vitamin D levels look normal at this time. Advised to continue after completion of the supplement on an over-the-counter vitamin D 2000 units daily.            Richard Willis

## 2019-08-07 NOTE — ASSESSMENT & PLAN NOTE
Blood pressure 136/82, pulse 70, temperature 98.3 °F (36.8 °C), temperature source Oral, resp. rate 20, height 5' 6\" (1.676 m), weight 184 lb 4.8 oz (83.6 kg), not currently breastfeeding. Blood pressure seems stable.   She has been taking losartan 50 mg–

## 2019-08-07 NOTE — ASSESSMENT & PLAN NOTE
Patient has been on vitamin D supplements 50,000 units once a week. She has tolerated it well. Vitamin D levels look normal at this time. Advised to continue after completion of the supplement on an over-the-counter vitamin D 2000 units daily.

## 2019-08-07 NOTE — ASSESSMENT & PLAN NOTE
2 episodes of syncope– suggestive of seizure disorder. She has been seen by neurology–Dr. David Mendoza and was advised to start on AEDs but patient requested to hold off, she does have an upcoming appointment in December to evaluate.   She has been advised not

## 2019-08-07 NOTE — ASSESSMENT & PLAN NOTE
Newly diagnosed anemia–hemoglobin at 11.4. Colonoscopy due on 01/16/2023  Colonoscopy is up-to-date. She has not noticed any blood in her stools, black stools, constipation or any other alteration in her bowel movements. Appetite has been stable.   She h

## 2019-08-07 NOTE — ASSESSMENT & PLAN NOTE
Lipid panel and liver function test have been stable on diet control alone. She did not start on her statin.   We will continue to monitor, recheck labs in about 6 months peer

## 2019-08-28 ENCOUNTER — LAB ENCOUNTER (OUTPATIENT)
Dept: LAB | Facility: HOSPITAL | Age: 59
End: 2019-08-28
Attending: INTERNAL MEDICINE
Payer: COMMERCIAL

## 2019-08-28 DIAGNOSIS — D64.9 ANEMIA, UNSPECIFIED TYPE: ICD-10-CM

## 2019-08-28 LAB
IRON SATURATION: 16 % (ref 15–50)
IRON SERPL-MCNC: 61 UG/DL (ref 50–170)
TOTAL IRON BINDING CAPACITY: 378 UG/DL (ref 240–450)
TRANSFERRIN SERPL-MCNC: 254 MG/DL (ref 200–360)

## 2019-08-28 PROCEDURE — 36415 COLL VENOUS BLD VENIPUNCTURE: CPT

## 2019-08-28 PROCEDURE — 83550 IRON BINDING TEST: CPT

## 2019-08-28 PROCEDURE — 83540 ASSAY OF IRON: CPT

## 2019-09-03 DIAGNOSIS — D64.9 ANEMIA, UNSPECIFIED TYPE: Primary | ICD-10-CM

## 2019-09-20 ENCOUNTER — LAB ENCOUNTER (OUTPATIENT)
Dept: LAB | Facility: HOSPITAL | Age: 59
End: 2019-09-20
Attending: INTERNAL MEDICINE
Payer: COMMERCIAL

## 2019-09-20 DIAGNOSIS — D64.9 MILD ANEMIA: ICD-10-CM

## 2019-09-20 LAB
BASOPHILS # BLD AUTO: 0.02 X10(3) UL (ref 0–0.2)
BASOPHILS NFR BLD AUTO: 0.3 %
DEPRECATED RDW RBC AUTO: 40.1 FL (ref 35.1–46.3)
EOSINOPHIL # BLD AUTO: 0.06 X10(3) UL (ref 0–0.7)
EOSINOPHIL NFR BLD AUTO: 0.9 %
ERYTHROCYTE [DISTWIDTH] IN BLOOD BY AUTOMATED COUNT: 12.9 % (ref 11–15)
HCT VFR BLD AUTO: 34.7 % (ref 35–48)
HGB BLD-MCNC: 11.3 G/DL (ref 12–16)
IMM GRANULOCYTES # BLD AUTO: 0.01 X10(3) UL (ref 0–1)
IMM GRANULOCYTES NFR BLD: 0.2 %
LYMPHOCYTES # BLD AUTO: 3.37 X10(3) UL (ref 1–4)
LYMPHOCYTES NFR BLD AUTO: 51.8 %
MCH RBC QN AUTO: 27.7 PG (ref 26–34)
MCHC RBC AUTO-ENTMCNC: 32.6 G/DL (ref 31–37)
MCV RBC AUTO: 85 FL (ref 80–100)
MONOCYTES # BLD AUTO: 0.34 X10(3) UL (ref 0.1–1)
MONOCYTES NFR BLD AUTO: 5.2 %
NEUTROPHILS # BLD AUTO: 2.71 X10 (3) UL (ref 1.5–7.7)
NEUTROPHILS # BLD AUTO: 2.71 X10(3) UL (ref 1.5–7.7)
NEUTROPHILS NFR BLD AUTO: 41.6 %
PLATELET # BLD AUTO: 225 10(3)UL (ref 150–450)
RBC # BLD AUTO: 4.08 X10(6)UL (ref 3.8–5.3)
WBC # BLD AUTO: 6.5 X10(3) UL (ref 4–11)

## 2019-09-20 PROCEDURE — 36415 COLL VENOUS BLD VENIPUNCTURE: CPT

## 2019-09-20 PROCEDURE — 85025 COMPLETE CBC W/AUTO DIFF WBC: CPT

## 2019-10-03 ENCOUNTER — TELEPHONE (OUTPATIENT)
Dept: INTERNAL MEDICINE CLINIC | Facility: CLINIC | Age: 59
End: 2019-10-03

## 2019-10-03 DIAGNOSIS — D64.9 ANEMIA, UNSPECIFIED TYPE: Primary | ICD-10-CM

## 2019-10-03 NOTE — TELEPHONE ENCOUNTER
Dr. Chris Askew: patient advise on results dated 9/20/19 CBC. Please advise if any additional labs needed.

## 2019-10-05 NOTE — TELEPHONE ENCOUNTER
Hemoglobin remains slightly low at 11.3. Blood counts are otherwise normal.  Please advised to repeat a CBC, serum iron, TIBC in 4 weeks. Please set up an appointment for follow-up at that time.

## 2019-10-07 NOTE — TELEPHONE ENCOUNTER
Dr Faina Merchant, patient needs to schedule f/u with you for anemia, will repeat labs Mon 10/21/19, can you see her for morning appt later that week?

## 2019-10-09 NOTE — TELEPHONE ENCOUNTER
The patient called stating she is having her blood work done on 10/21/19 and needs an appointment after.   I looked through them and all I saw was one appointment for 10/23/19 at 4:30 pm    The patient accept the appointment but wanted to be put on a cancel

## 2019-10-22 ENCOUNTER — LAB ENCOUNTER (OUTPATIENT)
Dept: LAB | Facility: HOSPITAL | Age: 59
End: 2019-10-22
Attending: INTERNAL MEDICINE
Payer: COMMERCIAL

## 2019-10-22 DIAGNOSIS — D64.9 ANEMIA, UNSPECIFIED TYPE: ICD-10-CM

## 2019-10-22 PROCEDURE — 36415 COLL VENOUS BLD VENIPUNCTURE: CPT

## 2019-10-22 PROCEDURE — 83550 IRON BINDING TEST: CPT

## 2019-10-22 PROCEDURE — 83540 ASSAY OF IRON: CPT

## 2019-10-22 PROCEDURE — 85025 COMPLETE CBC W/AUTO DIFF WBC: CPT

## 2019-10-23 ENCOUNTER — OFFICE VISIT (OUTPATIENT)
Dept: INTERNAL MEDICINE CLINIC | Facility: CLINIC | Age: 59
End: 2019-10-23

## 2019-10-23 VITALS
TEMPERATURE: 98 F | BODY MASS INDEX: 29.55 KG/M2 | SYSTOLIC BLOOD PRESSURE: 135 MMHG | RESPIRATION RATE: 18 BRPM | HEIGHT: 66 IN | DIASTOLIC BLOOD PRESSURE: 76 MMHG | WEIGHT: 183.88 LBS | HEART RATE: 69 BPM

## 2019-10-23 DIAGNOSIS — D64.9 ANEMIA, UNSPECIFIED TYPE: Primary | ICD-10-CM

## 2019-10-23 PROCEDURE — 90686 IIV4 VACC NO PRSV 0.5 ML IM: CPT | Performed by: INTERNAL MEDICINE

## 2019-10-23 PROCEDURE — 99213 OFFICE O/P EST LOW 20 MIN: CPT | Performed by: INTERNAL MEDICINE

## 2019-10-23 PROCEDURE — 90471 IMMUNIZATION ADMIN: CPT | Performed by: INTERNAL MEDICINE

## 2019-10-23 RX ORDER — LOSARTAN POTASSIUM 50 MG/1
TABLET ORAL
Qty: 90 TABLET | Refills: 2 | Status: SHIPPED | OUTPATIENT
Start: 2019-10-23 | End: 2021-04-08

## 2019-10-23 NOTE — PROGRESS NOTES
HPI:    Patient ID: Navin Westfall is a 63year old female. OPERATIVE REPORT-2017        PREOPERATIVE DIAGNOSIS:  Family history of colon cancer. POSTOPERATIVE DIAGNOSIS:    1. Diverticulosis, few, left-sided, uncomplicated.   2.       Internal hem Cardiovascular: Negative. Gastrointestinal: Negative. Skin: Negative. Neurological: Negative. Negative for weakness and numbness.             Current Outpatient Medications   Medication Sig Dispense Refill   • Losartan Potassium 50 MG Oral Tab 1 ASSESSMENT/PLAN:     Problem List Items Addressed This Visit        Unprioritized    Anemia - Primary     Intermittent episodes of mild anemia but at this time progressive since August 2019.   She has not had any change in her bowel movements–constipation

## 2019-10-23 NOTE — ASSESSMENT & PLAN NOTE
Intermittent episodes of mild anemia but at this time progressive since August 2019. She has not had any change in her bowel movements–constipation, diarrhea, blood in stools or black stools. Appetite has been stable, weight has been stable.   She had anayeli

## 2019-10-23 NOTE — PATIENT INSTRUCTIONS
Problem List Items Addressed This Visit        Unprioritized    Anemia - Primary     Intermittent episodes of mild anemia but at this time progressive since August 2019.   She has not had any change in her bowel movements–constipation, diarrhea, blood in st

## 2019-10-29 ENCOUNTER — TELEPHONE (OUTPATIENT)
Dept: INTERNAL MEDICINE CLINIC | Facility: CLINIC | Age: 59
End: 2019-10-29

## 2019-10-29 NOTE — TELEPHONE ENCOUNTER
Pt called stating she would like another referral for a gastro doctor that is located near her.  Please advise

## 2019-10-31 NOTE — TELEPHONE ENCOUNTER
Dr. Maira Ly, patient is requesting a referral for Gastro near her home. Patient is located in [facility], Please advise.  Please reply to pool: EM TRIAGE SUPPORT

## 2019-12-16 ENCOUNTER — OFFICE VISIT (OUTPATIENT)
Dept: GASTROENTEROLOGY | Facility: CLINIC | Age: 59
End: 2019-12-16

## 2019-12-16 VITALS
SYSTOLIC BLOOD PRESSURE: 112 MMHG | BODY MASS INDEX: 29.89 KG/M2 | HEART RATE: 85 BPM | HEIGHT: 66 IN | DIASTOLIC BLOOD PRESSURE: 75 MMHG | WEIGHT: 186 LBS

## 2019-12-16 DIAGNOSIS — D64.9 ANEMIA, NORMOCYTIC NORMOCHROMIC: Primary | ICD-10-CM

## 2019-12-16 DIAGNOSIS — K21.9 GASTROESOPHAGEAL REFLUX DISEASE, ESOPHAGITIS PRESENCE NOT SPECIFIED: ICD-10-CM

## 2019-12-16 PROBLEM — Z80.0 FAMILY HISTORY OF COLON CANCER IN FATHER: Status: ACTIVE | Noted: 2019-12-16

## 2019-12-16 PROCEDURE — 99244 OFF/OP CNSLTJ NEW/EST MOD 40: CPT | Performed by: INTERNAL MEDICINE

## 2019-12-16 NOTE — PATIENT INSTRUCTIONS
The pathophysiology of acid reflux was discussed. Discussed patient symptoms and triggers.  Reviewed anti-reflux measures such as raising the head of the bed at night, avoiding tight clothing or belts, avoiding eating late at night and not lying down shortl placement, etc.] as indicated.

## 2019-12-16 NOTE — H&P
[facility], [facility] - Gastroenterology  Clinic History and Physical     Patient presents with:  Anemia      HPI:   Ashlyn Haq is a 63year old year-old female with history of HTN, diverticulosis and hemorrhoids presenting for progressive anemia.  Per roxie smoking  No alcohol  No recreational drinks      History, Medications, Allergies, ROS:      Past Medical History:   Diagnosis Date   • Diverticulosis    • Diverticulosis large intestine w/o perforation or abscess w/o bleeding 1/16/2018   • Dry eye 2012 behavior      PHYSICAL EXAM:   /75 (BP Location: Left arm, Patient Position: Sitting, Cuff Size: large)   Pulse 85   Ht 5' 6\" (1.676 m)   Wt 186 lb (84.4 kg)   BMI 30.02 kg/m²       Gen: Patient appears comfortable and in no acute discomfort  HEENT: 51.8  51.3   Monocytes %      % 4.9 5.2  5.4   Eosinophils %      % 1.4 0.9  0.8   Basophils %      % 0.5 0.3  0.2   Immature Granulocyte %      % 0.2 0.2  0.2   Glucose      70 - 99 mg/dL    92   Sodium      136 - 145 mmol/L    140   Potassium      3.5 - below.    3. Recall colonoscopy due 1/2023 due to family history of colon CA    Recommend:  EGD with MAC sedation for anemia and abdominal pain  The pathophysiology of acid reflux was discussed. Discussed patient symptoms and triggers.  Reviewed anti-reflux upper endoscopy/enteroscopy with possible intervention [i.e. polypectomy, ablation, stent placement, etc.] as indicated. Orders This Visit:  No orders of the defined types were placed in this encounter.       Meds This Visit:  Requested Prescriptions

## 2019-12-20 ENCOUNTER — TELEPHONE (OUTPATIENT)
Dept: GASTROENTEROLOGY | Facility: CLINIC | Age: 59
End: 2019-12-20

## 2019-12-20 DIAGNOSIS — R10.9 ABDOMINAL PAIN, UNSPECIFIED ABDOMINAL LOCATION: ICD-10-CM

## 2019-12-20 DIAGNOSIS — D64.9 ANEMIA, UNSPECIFIED TYPE: Primary | ICD-10-CM

## 2019-12-24 NOTE — TELEPHONE ENCOUNTER
Scheduled for:  EGD 75925  Provider Name: Dr. Richardson Goodpasture  Date:  2/13/20  Location:  [facility]  Sedation:  MAC  Time:   5891 (pt is aware to arrive at 0945)   Prep:  NPO after midnight, mailed 12/27/19  Meds/Allergies Reconciled?:  Physician reviewed   Diagnosis with

## 2020-02-03 ENCOUNTER — TELEPHONE (OUTPATIENT)
Dept: GASTROENTEROLOGY | Facility: CLINIC | Age: 60
End: 2020-02-03

## 2020-02-03 NOTE — TELEPHONE ENCOUNTER
I spoke to the pt and I advised her of her EGD instructions. She will hold her Losartan the night before the procedure. She will be NPO after midnight. We went over where she should park . East elevators to the second floor.     Pt verbalizes understanding

## 2020-02-03 NOTE — TELEPHONE ENCOUNTER
Patient calling to speak with nurse, patient indicates the prep information she received indicates colonoscopy. Patient requesting instructions for egd, please call at:[phone],thanks.

## 2020-02-13 ENCOUNTER — ANESTHESIA EVENT (OUTPATIENT)
Dept: ENDOSCOPY | Facility: HOSPITAL | Age: 60
End: 2020-02-13
Payer: COMMERCIAL

## 2020-02-13 ENCOUNTER — HOSPITAL ENCOUNTER (OUTPATIENT)
Facility: HOSPITAL | Age: 60
Setting detail: HOSPITAL OUTPATIENT SURGERY
Discharge: HOME | End: 2020-02-13
Attending: INTERNAL MEDICINE | Admitting: INTERNAL MEDICINE
Payer: COMMERCIAL

## 2020-02-13 ENCOUNTER — ANESTHESIA (OUTPATIENT)
Dept: ENDOSCOPY | Facility: HOSPITAL | Age: 60
End: 2020-02-13
Payer: COMMERCIAL

## 2020-02-13 DIAGNOSIS — D64.9 ANEMIA, UNSPECIFIED TYPE: ICD-10-CM

## 2020-02-13 DIAGNOSIS — R10.9 ABDOMINAL PAIN, UNSPECIFIED ABDOMINAL LOCATION: ICD-10-CM

## 2020-02-13 PROCEDURE — 0DB38ZX EXCISION OF LOWER ESOPHAGUS, VIA NATURAL OR ARTIFICIAL OPENING ENDOSCOPIC, DIAGNOSTIC: ICD-10-PCS | Performed by: INTERNAL MEDICINE

## 2020-02-13 PROCEDURE — 0DB98ZX EXCISION OF DUODENUM, VIA NATURAL OR ARTIFICIAL OPENING ENDOSCOPIC, DIAGNOSTIC: ICD-10-PCS | Performed by: INTERNAL MEDICINE

## 2020-02-13 PROCEDURE — 43239 EGD BIOPSY SINGLE/MULTIPLE: CPT | Performed by: INTERNAL MEDICINE

## 2020-02-13 PROCEDURE — 0DB68ZX EXCISION OF STOMACH, VIA NATURAL OR ARTIFICIAL OPENING ENDOSCOPIC, DIAGNOSTIC: ICD-10-PCS | Performed by: INTERNAL MEDICINE

## 2020-02-13 RX ORDER — PANTOPRAZOLE SODIUM 40 MG/1
40 TABLET, DELAYED RELEASE ORAL
Qty: 90 TABLET | Refills: 3 | Status: SHIPPED | OUTPATIENT
Start: 2020-02-13 | End: 2020-07-28

## 2020-02-13 RX ORDER — LIDOCAINE HYDROCHLORIDE 10 MG/ML
INJECTION, SOLUTION EPIDURAL; INFILTRATION; INTRACAUDAL; PERINEURAL AS NEEDED
Status: DISCONTINUED | OUTPATIENT
Start: 2020-02-13 | End: 2020-02-13 | Stop reason: SURG

## 2020-02-13 RX ORDER — SODIUM CHLORIDE, SODIUM LACTATE, POTASSIUM CHLORIDE, CALCIUM CHLORIDE 600; 310; 30; 20 MG/100ML; MG/100ML; MG/100ML; MG/100ML
INJECTION, SOLUTION INTRAVENOUS CONTINUOUS
Status: DISCONTINUED | OUTPATIENT
Start: 2020-02-13 | End: 2020-02-13

## 2020-02-13 RX ORDER — SODIUM CHLORIDE, SODIUM LACTATE, POTASSIUM CHLORIDE, CALCIUM CHLORIDE 600; 310; 30; 20 MG/100ML; MG/100ML; MG/100ML; MG/100ML
INJECTION, SOLUTION INTRAVENOUS CONTINUOUS PRN
Status: DISCONTINUED | OUTPATIENT
Start: 2020-02-13 | End: 2020-02-13 | Stop reason: SURG

## 2020-02-13 RX ADMIN — LIDOCAINE HYDROCHLORIDE 50 MG: 10 INJECTION, SOLUTION EPIDURAL; INFILTRATION; INTRACAUDAL; PERINEURAL at 10:45:00

## 2020-02-13 RX ADMIN — SODIUM CHLORIDE, SODIUM LACTATE, POTASSIUM CHLORIDE, CALCIUM CHLORIDE: 600; 310; 30; 20 INJECTION, SOLUTION INTRAVENOUS at 11:00:00

## 2020-02-13 RX ADMIN — SODIUM CHLORIDE, SODIUM LACTATE, POTASSIUM CHLORIDE, CALCIUM CHLORIDE: 600; 310; 30; 20 INJECTION, SOLUTION INTRAVENOUS at 10:45:00

## 2020-02-13 NOTE — ANESTHESIA POSTPROCEDURE EVALUATION
Patient: Ezra Barriga    Procedure Summary     Date:  02/13/20 Room / Location:  [address] [place] [address] ENDOSCOPY 05 / [address] [place] [address] ENDOSCOPY    Anesthesia Start:  7759 Anesthesia Stop:  1106    Procedure:  ESOPHAGOGASTRODUODENOSCOPY (EGD) (N/A ) Diagnosis:       Anemia, unspecified

## 2020-02-13 NOTE — OPERATIVE REPORT
ESOPHAGOGASTRODUODENOSCOPY (EGD) REPORT    Librado Resendez    MIGUEL 1960 Age 63year old   PCP Marcella Mayorga MD Endoscopist Mercy Lo MD       Date of procedure: 20    Procedure: EGD w/biopsies    Pre-operative diagnosis: anemia and reflux    Pos Celiac    Impression:  1. [place] colored mucosa r/o barretts    Recommend:  1. Await biopsies   2. The pathophysiology of acid reflux was discussed. Discussed patient symptoms and triggers.  Reviewed anti-reflux measures such as raising the head of the bed

## 2020-02-13 NOTE — H&P
Pre Procedure History & Physical Examination    Patient Name: Russel Harrison  MRN: [id number]  CSN: [id number]  YOB: 1960    Diagnosis: reflux and anemia    Losartan Potassium 50 MG Oral Tab, 1 tab po q d, Disp: 90 tablet, Rfl: 2, 2/12/2020  e for jaundice   ALLERGIC/IMMUNOLOGIC:  negative for hay fever  ENDOCRINE:  negative for cold intolerance and heat intolerance  MUSCULOSKELETAL:  negative for joint effusion/severe erythema  BEHAVIOR/PSYCH:  negative for psychotic behavior      PHYSICAL EXAM

## 2020-02-13 NOTE — ANESTHESIA PREPROCEDURE EVALUATION
Anesthesia PreOp Note    HPI:     Josue Bailey is a 63year old female who presents for preoperative consultation requested by: Sammie Read MD    Date of Surgery: 2/13/2020    Procedure(s):  ESOPHAGOGASTRODUODENOSCOPY (EGD)  Indication: Anemia, unspecif blood pressure    • Internal hemorrhoids 1/16/2018   • MGD (meibomian gland dysfunction) 2012       Past Surgical History:   Procedure Laterality Date   • COLONOSCOPY  2012   • COLONOSCOPY N/A 1/16/2018    Performed by Marilu Bueno MD at [facility] file        Attends meetings of clubs or organizations: Not on file        Relationship status: Not on file      Intimate partner violence:        Fear of current or ex partner: Not on file        Emotionally abused: Not on file        Physically abused: N answered to the best of my ability. The patient desires the anesthetic management as planned.   JUDY MANUEL  2/13/2020 10:35 AM

## 2020-02-14 VITALS
OXYGEN SATURATION: 99 % | WEIGHT: 190 LBS | BODY MASS INDEX: 30.53 KG/M2 | HEIGHT: 66 IN | SYSTOLIC BLOOD PRESSURE: 122 MMHG | HEART RATE: 58 BPM | RESPIRATION RATE: 15 BRPM | DIASTOLIC BLOOD PRESSURE: 82 MMHG

## 2020-03-03 ENCOUNTER — TELEPHONE (OUTPATIENT)
Dept: GASTROENTEROLOGY | Facility: CLINIC | Age: 60
End: 2020-03-03

## 2020-03-03 NOTE — TELEPHONE ENCOUNTER
Patient was notified with understanding. She is currently feeling well. Advised 6-8 month follow up. Discuss with PCP to see if she wishes to monitor blood work. She will do so. I have personally interviewed and examined this patient, reviewed pertinent labs and imaging, and discussed the case with colleagues, residents, physician assistants, and nurses on SICU rounds.    30     minutes in total were spent in providing direct critical care for the diagnoses, assessment and plan outlined below.  These diagnoses are unrelated to the surgical procedure.  Additionally, time spent in the performance of separately billable procedures was not counted toward the critical care time.  There is no overlap.    The active critical care issues are:     I63.512 Cerebral infarction involving left middle cerebral artery   R33.9 Urinary retention   I95.9 Hypotension, unspecified hypotension type   I69.391 Dysphagia due to recent cerebral infarction        Neuro: neuro checks, does not appear to have improved exam with vasopressors  pulm: home nebs  cards: midodrine, plavix, statin, starting asa, will titrate down madai gtt and follow exam  gi: dysphagia diet, MBS  gu: monitor urine output, robb replaced for retention, will attempt trial of void  heme: wbc up, afebrile  ID: no active issues  endo: monitor blood sugar, dc steroids  proph: sqh.

## 2020-03-03 NOTE — TELEPHONE ENCOUNTER
----- Message from Marielle Harry MD sent at 3/3/2020 10:55 AM CST -----  No etiology for MOHINDER found  Follow up in 6-8 months

## 2020-07-09 ENCOUNTER — TELEPHONE (OUTPATIENT)
Dept: INTERNAL MEDICINE CLINIC | Facility: CLINIC | Age: 60
End: 2020-07-09

## 2020-07-09 DIAGNOSIS — Z12.31 VISIT FOR SCREENING MAMMOGRAM: Primary | ICD-10-CM

## 2020-07-09 NOTE — TELEPHONE ENCOUNTER
Pt overdue for mammo.  Order generated, per JASVIR's approval.     Jerson Herrera - reminding pt to schedule

## 2020-07-28 RX ORDER — PANTOPRAZOLE SODIUM 40 MG/1
TABLET, DELAYED RELEASE ORAL
Qty: 90 TABLET | Refills: 0 | Status: SHIPPED | OUTPATIENT
Start: 2020-07-28 | End: 2020-12-18

## 2020-07-28 NOTE — TELEPHONE ENCOUNTER
Requested Prescriptions     Pending Prescriptions Disp Refills   • PANTOPRAZOLE SODIUM 40 MG Oral Tab EC [Pharmacy Med Name: Pantoprazole Sodium 40 MG Oral Tablet Delayed Release] 90 tablet 0     Sig: TAKE 1 TABLET BY MOUTH IN THE MORNING BEFORE BREAKFAST

## 2020-08-21 ENCOUNTER — TELEPHONE (OUTPATIENT)
Dept: INTERNAL MEDICINE CLINIC | Facility: CLINIC | Age: 60
End: 2020-08-21

## 2020-08-21 DIAGNOSIS — E78.5 HYPERLIPIDEMIA, UNSPECIFIED HYPERLIPIDEMIA TYPE: Primary | ICD-10-CM

## 2020-08-21 DIAGNOSIS — E55.9 VITAMIN D DEFICIENCY: ICD-10-CM

## 2020-08-21 DIAGNOSIS — I10 ESSENTIAL HYPERTENSION: ICD-10-CM

## 2020-08-21 NOTE — TELEPHONE ENCOUNTER
Dr. Chris Askew please advise. On 8/7/19 you ordered these tests but pt did not complete them and the order is no longer valid.      Lipid, TSH, UA, Vit B12, Vit D, CBC, CMP  Iron and TIBC were ordered and done on 10/22/19

## 2020-08-26 NOTE — TELEPHONE ENCOUNTER
Labs have been placed as requested. Spoke to patient to let her know her labs have been ordered.  Central scheduling phone number was provided

## 2020-08-28 ENCOUNTER — NURSE TRIAGE (OUTPATIENT)
Dept: INTERNAL MEDICINE CLINIC | Facility: CLINIC | Age: 60
End: 2020-08-28

## 2020-08-28 ENCOUNTER — TELEPHONE (OUTPATIENT)
Dept: INTERNAL MEDICINE CLINIC | Facility: CLINIC | Age: 60
End: 2020-08-28

## 2020-08-28 DIAGNOSIS — H53.8 BLURRY VISION: Primary | ICD-10-CM

## 2020-08-28 NOTE — TELEPHONE ENCOUNTER
Action Requested: Summary for Provider     []  Critical Lab, Recommendations Needed  [] Need Additional Advice  []   FYI    []   Need Orders  [] Need Medications Sent to Pharmacy  []  Other     SUMMARY: DR Chris Askew: please advise; will need referral to see e

## 2020-08-28 NOTE — TELEPHONE ENCOUNTER
Pt would like a referral for eye specialist and is looking to see one close to her house in [place].  Please advise

## 2020-09-06 ENCOUNTER — LAB ENCOUNTER (OUTPATIENT)
Dept: LAB | Facility: HOSPITAL | Age: 60
End: 2020-09-06
Attending: INTERNAL MEDICINE
Payer: COMMERCIAL

## 2020-09-06 DIAGNOSIS — I10 ESSENTIAL HYPERTENSION: ICD-10-CM

## 2020-09-06 DIAGNOSIS — E55.9 VITAMIN D DEFICIENCY: ICD-10-CM

## 2020-09-06 DIAGNOSIS — E78.5 HYPERLIPIDEMIA, UNSPECIFIED HYPERLIPIDEMIA TYPE: ICD-10-CM

## 2020-09-06 LAB
ALBUMIN SERPL-MCNC: 3.9 G/DL (ref 3.4–5)
ALBUMIN/GLOB SERPL: 0.9 {RATIO} (ref 1–2)
ALP LIVER SERPL-CCNC: 70 U/L (ref 46–118)
ALT SERPL-CCNC: 25 U/L (ref 13–56)
ANION GAP SERPL CALC-SCNC: 2 MMOL/L (ref 0–18)
AST SERPL-CCNC: 14 U/L (ref 15–37)
BASOPHILS # BLD AUTO: 0.02 X10(3) UL (ref 0–0.2)
BASOPHILS NFR BLD AUTO: 0.4 %
BILIRUB SERPL-MCNC: 0.4 MG/DL (ref 0.1–2)
BILIRUB UR QL STRIP.AUTO: NEGATIVE
BUN BLD-MCNC: 10 MG/DL (ref 7–18)
BUN/CREAT SERPL: 12.8 (ref 10–20)
CALCIUM BLD-MCNC: 9.7 MG/DL (ref 8.5–10.1)
CHLORIDE SERPL-SCNC: 108 MMOL/L (ref 98–112)
CHOLEST SMN-MCNC: 185 MG/DL (ref ?–200)
CLARITY UR REFRACT.AUTO: CLEAR
CO2 SERPL-SCNC: 29 MMOL/L (ref 21–32)
CREAT BLD-MCNC: 0.78 MG/DL (ref 0.55–1.02)
DEPRECATED RDW RBC AUTO: 42.6 FL (ref 35.1–46.3)
EOSINOPHIL # BLD AUTO: 0.05 X10(3) UL (ref 0–0.7)
EOSINOPHIL NFR BLD AUTO: 0.9 %
ERYTHROCYTE [DISTWIDTH] IN BLOOD BY AUTOMATED COUNT: 12.9 % (ref 11–15)
GLOBULIN PLAS-MCNC: 4.3 G/DL (ref 2.8–4.4)
GLUCOSE BLD-MCNC: 105 MG/DL (ref 70–99)
GLUCOSE UR STRIP.AUTO-MCNC: NEGATIVE MG/DL
HCT VFR BLD AUTO: 40.4 % (ref 35–48)
HDLC SERPL-MCNC: 47 MG/DL (ref 40–59)
HGB BLD-MCNC: 11.7 G/DL (ref 12–16)
IMM GRANULOCYTES # BLD AUTO: 0.01 X10(3) UL (ref 0–1)
IMM GRANULOCYTES NFR BLD: 0.2 %
KETONES UR STRIP.AUTO-MCNC: NEGATIVE MG/DL
LDLC SERPL CALC-MCNC: 108 MG/DL (ref ?–100)
LEUKOCYTE ESTERASE UR QL STRIP.AUTO: NEGATIVE
LYMPHOCYTES # BLD AUTO: 2.66 X10(3) UL (ref 1–4)
LYMPHOCYTES NFR BLD AUTO: 48.2 %
M PROTEIN MFR SERPL ELPH: 8.2 G/DL (ref 6.4–8.2)
MCH RBC QN AUTO: 26.2 PG (ref 26–34)
MCHC RBC AUTO-ENTMCNC: 29 G/DL (ref 31–37)
MCV RBC AUTO: 90.6 FL (ref 80–100)
MONOCYTES # BLD AUTO: 0.2 X10(3) UL (ref 0.1–1)
MONOCYTES NFR BLD AUTO: 3.6 %
NEUTROPHILS # BLD AUTO: 2.58 X10 (3) UL (ref 1.5–7.7)
NEUTROPHILS # BLD AUTO: 2.58 X10(3) UL (ref 1.5–7.7)
NEUTROPHILS NFR BLD AUTO: 46.7 %
NITRITE UR QL STRIP.AUTO: NEGATIVE
NONHDLC SERPL-MCNC: 138 MG/DL (ref ?–130)
OSMOLALITY SERPL CALC.SUM OF ELEC: 287 MOSM/KG (ref 275–295)
PATIENT FASTING Y/N/NP: YES
PATIENT FASTING Y/N/NP: YES
PH UR STRIP.AUTO: 6 [PH] (ref 4.5–8)
PLATELET # BLD AUTO: 211 10(3)UL (ref 150–450)
POTASSIUM SERPL-SCNC: 4.3 MMOL/L (ref 3.5–5.1)
PROT UR STRIP.AUTO-MCNC: NEGATIVE MG/DL
RBC # BLD AUTO: 4.46 X10(6)UL (ref 3.8–5.3)
RBC UR QL AUTO: NEGATIVE
SODIUM SERPL-SCNC: 139 MMOL/L (ref 136–145)
SP GR UR STRIP.AUTO: <1.005 (ref 1–1.03)
TRIGL SERPL-MCNC: 152 MG/DL (ref 30–149)
TSI SER-ACNC: 1.41 MIU/ML (ref 0.36–3.74)
UROBILINOGEN UR STRIP.AUTO-MCNC: <2 MG/DL
VIT B12 SERPL-MCNC: 670 PG/ML (ref 193–986)
VIT D+METAB SERPL-MCNC: 12.3 NG/ML (ref 30–100)
VLDLC SERPL CALC-MCNC: 30 MG/DL (ref 0–30)
WBC # BLD AUTO: 5.5 X10(3) UL (ref 4–11)

## 2020-09-06 PROCEDURE — 80061 LIPID PANEL: CPT

## 2020-09-06 PROCEDURE — 82306 VITAMIN D 25 HYDROXY: CPT

## 2020-09-06 PROCEDURE — 85025 COMPLETE CBC W/AUTO DIFF WBC: CPT

## 2020-09-06 PROCEDURE — 84443 ASSAY THYROID STIM HORMONE: CPT

## 2020-09-06 PROCEDURE — 80053 COMPREHEN METABOLIC PANEL: CPT

## 2020-09-06 PROCEDURE — 36415 COLL VENOUS BLD VENIPUNCTURE: CPT

## 2020-09-06 PROCEDURE — 81003 URINALYSIS AUTO W/O SCOPE: CPT

## 2020-09-06 PROCEDURE — 82607 VITAMIN B-12: CPT

## 2020-09-30 ENCOUNTER — TELEPHONE (OUTPATIENT)
Dept: INTERNAL MEDICINE CLINIC | Facility: CLINIC | Age: 60
End: 2020-09-30

## 2020-09-30 NOTE — TELEPHONE ENCOUNTER
Patient requesting to know if she can be tested for COVID. Patient states she will be a grandmother on October 5th and would like to be able to see her new grandchild. Patient did travel and stayed at a hotel for 5 days. Patient is not having any symptoms.

## 2020-10-02 NOTE — TELEPHONE ENCOUNTER
Patient calling to follow up. States baby will be induced on Monday and will like test done before that.

## 2020-10-02 NOTE — TELEPHONE ENCOUNTER
The son is asking for a note stating she can have a rapid test done. I stated she does not need a note but insurance will not cover without a note.

## 2020-10-19 ENCOUNTER — OFFICE VISIT (OUTPATIENT)
Dept: INTERNAL MEDICINE CLINIC | Facility: CLINIC | Age: 60
End: 2020-10-19

## 2020-10-19 VITALS
WEIGHT: 190 LBS | BODY MASS INDEX: 30.53 KG/M2 | RESPIRATION RATE: 16 BRPM | DIASTOLIC BLOOD PRESSURE: 83 MMHG | HEIGHT: 66 IN | HEART RATE: 66 BPM | SYSTOLIC BLOOD PRESSURE: 136 MMHG

## 2020-10-19 DIAGNOSIS — E55.9 VITAMIN D DEFICIENCY: ICD-10-CM

## 2020-10-19 DIAGNOSIS — Z00.00 ROUTINE GENERAL MEDICAL EXAMINATION AT A HEALTH CARE FACILITY: ICD-10-CM

## 2020-10-19 DIAGNOSIS — Z23 NEED FOR VACCINATION: ICD-10-CM

## 2020-10-19 DIAGNOSIS — Z00.00 WELLNESS EXAMINATION: ICD-10-CM

## 2020-10-19 DIAGNOSIS — Z87.898 HISTORY OF SEIZURES: ICD-10-CM

## 2020-10-19 DIAGNOSIS — I10 ESSENTIAL HYPERTENSION: Primary | ICD-10-CM

## 2020-10-19 DIAGNOSIS — E78.5 HYPERLIPIDEMIA, UNSPECIFIED HYPERLIPIDEMIA TYPE: ICD-10-CM

## 2020-10-19 DIAGNOSIS — D64.9 ANEMIA, UNSPECIFIED TYPE: ICD-10-CM

## 2020-10-19 DIAGNOSIS — Z01.419 ENCOUNTER FOR GYNECOLOGICAL EXAMINATION WITHOUT ABNORMAL FINDING: ICD-10-CM

## 2020-10-19 PROCEDURE — 3008F BODY MASS INDEX DOCD: CPT | Performed by: INTERNAL MEDICINE

## 2020-10-19 PROCEDURE — 99396 PREV VISIT EST AGE 40-64: CPT | Performed by: INTERNAL MEDICINE

## 2020-10-19 PROCEDURE — 3079F DIAST BP 80-89 MM HG: CPT | Performed by: INTERNAL MEDICINE

## 2020-10-19 PROCEDURE — 90471 IMMUNIZATION ADMIN: CPT | Performed by: INTERNAL MEDICINE

## 2020-10-19 PROCEDURE — 90686 IIV4 VACC NO PRSV 0.5 ML IM: CPT | Performed by: INTERNAL MEDICINE

## 2020-10-19 PROCEDURE — 3075F SYST BP GE 130 - 139MM HG: CPT | Performed by: INTERNAL MEDICINE

## 2020-10-19 RX ORDER — ERGOCALCIFEROL 1.25 MG/1
CAPSULE ORAL
COMMUNITY
End: 2021-04-08

## 2020-10-19 NOTE — ASSESSMENT & PLAN NOTE
Blood pressure 136/83, pulse 66, resp. rate 16, height 5' 6\" (1.676 m), weight 190 lb (86.2 kg), not currently breastfeeding. Stable blood pressure, well controlled at this time on losartan 50 mg daily.   She has not had any chest pain, palpitations, ligh

## 2020-10-19 NOTE — ASSESSMENT & PLAN NOTE
Pap smear completed. Minimal uterine prolapse. No intervention needed at this time. No cystocele or rectocele noted.

## 2020-10-19 NOTE — ASSESSMENT & PLAN NOTE
Patient has been restarted on vitamin D supplements 50,000 units once a week for the next 6 months. Continue on the same at this time and recheck labs as discussed.

## 2020-10-19 NOTE — ASSESSMENT & PLAN NOTE
History of seizure disorders. EEG and neurology follow-up per Dr. William Frey. Patient would like to stop driving at this time–advised to set up an appointment with neurology for release to drive. Referral provided.

## 2020-10-19 NOTE — ASSESSMENT & PLAN NOTE
Normal exam.  Labs as ordered. Skin check normal.  No significant abnormal nevi. Breast exam completed–no palpable abnormalities, discharge from the nipples or axillary adenopathy. Mammogram ordered in July are still pending.   Reprinted orders for compl

## 2020-10-19 NOTE — PATIENT INSTRUCTIONS
Problem List Items Addressed This Visit        Unprioritized    Anemia     Mild persistent anemia–intermittent. She has had a colonoscopy completed. She also had an EGD completed recently. Results discussed.   She has had occasional bright red blood per palpable abnormalities, discharge from the nipples or axillary adenopathy. Mammogram ordered in July are still pending. Reprinted orders for completion. No cervical or inguinal lymphadenopathy. Hernial orifices intact.   Rectal exam normal,no palpable a

## 2020-10-19 NOTE — ASSESSMENT & PLAN NOTE
Mild persistent anemia–intermittent. She has had a colonoscopy completed. She also had an EGD completed recently. Results discussed. She has had occasional bright red blood per rectum from hemorrhoidal bleeds.   She has been menopausal for the past 6 to

## 2020-10-19 NOTE — ASSESSMENT & PLAN NOTE
Lipid panel and liver function tests have been stable on diet control alone. She has not started on statins. Continue to monitor the diet, recheck labs and follow-up in about a year.

## 2020-10-19 NOTE — PROGRESS NOTES
HPI:   Daylin Alatorre is a 62year old female who presents for an Annual Health Visit. S/p egd 2/2020      Findings:       1. Esophagus:  The squamocolumnar junction was noted at 38 cm and appeared irregular with a small island of salmon colore on Giemsa stain (with appropriate control reactivity).     C. Distal esophagus; biopsy:  · Fragments of squamous esophageal mucosa showing features compatible with mild chronic reflux esophagitis.   · Adjacent area and separate fragment of gastric-type muco Tab EC TAKE 1 TABLET BY MOUTH IN THE MORNING BEFORE BREAKFAST 90 tablet 0   • Losartan Potassium 50 MG Oral Tab 1 tab po q d 90 tablet 2      HISTORICAL INFORMATION   Past Medical History:   Diagnosis Date   • Diverticulosis    • Diverticulosis large intes kg)  02/10/20 : 190 lb (86.2 kg)  12/16/19 : 186 lb (84.4 kg)  10/23/19 : 183 lb 14.4 oz (83.4 kg)  08/07/19 : 184 lb 4.8 oz (83.6 kg)  06/12/19 : 184 lb (83.5 kg)    Body mass index is 30.67 kg/m².     Physical Exam   Constitutional: She is oriented to per exhibits no motion tenderness, no discharge and no friability. Right adnexum displays no mass, no tenderness and no fullness. Left adnexum displays no mass, no tenderness and no fullness. No vaginal discharge, erythema, tenderness or bleeding.    No eryt without abnormal finding    Wellness examination    Vitamin D deficiency  -     VITAMIN D, 25-HYDROXY; Future    Need for vaccination  -     FLULAVAL INFLUENZA VACCINE QUAD PRESERVATIVE FREE 0.5 ML    History of seizures  -     NEURO - INTERNAL    Anemia, statins. Continue to monitor the diet, recheck labs and follow-up in about a year. Routine general medical examination at a health care facility     Normal exam.  Labs as ordered. Skin check normal.  No significant abnormal nevi.   Breast exam com bilateral     Menopause     Encounter for routine gynecological examination     Cystocele, midline     Elevated blood sugar     Gastritis     Diverticulosis large intestine w/o perforation or abscess w/o bleeding     Internal hemorrhoids     Syncopal episo

## 2020-10-23 RX ORDER — LOSARTAN POTASSIUM 25 MG/1
TABLET ORAL
Qty: 60 TABLET | Refills: 5 | Status: SHIPPED | OUTPATIENT
Start: 2020-10-23 | End: 2021-04-08

## 2020-12-18 RX ORDER — PANTOPRAZOLE SODIUM 40 MG/1
TABLET, DELAYED RELEASE ORAL
Qty: 90 TABLET | Refills: 0 | Status: SHIPPED | OUTPATIENT
Start: 2020-12-18 | End: 2021-06-09

## 2021-01-27 ENCOUNTER — NURSE TRIAGE (OUTPATIENT)
Dept: INTERNAL MEDICINE CLINIC | Facility: CLINIC | Age: 61
End: 2021-01-27

## 2021-01-27 NOTE — TELEPHONE ENCOUNTER
Action Requested: Summary for Provider     []  Critical Lab, Recommendations Needed  [] Need Additional Advice  []   FYI    []   Need Orders  [] Need Medications Sent to Pharmacy  []  Other     SUMMARY: pt states that she has had left posterior knee pain.

## 2021-01-28 ENCOUNTER — TELEPHONE (OUTPATIENT)
Dept: INTERNAL MEDICINE CLINIC | Facility: CLINIC | Age: 61
End: 2021-01-28

## 2021-01-28 NOTE — TELEPHONE ENCOUNTER
Pt said she read message is requesting appt for the morning. Pt said she does not want evening.  Please advise

## 2021-01-28 NOTE — TELEPHONE ENCOUNTER
Unable to reach patient or leave message to call back on either number--[facility] message sent to patient

## 2021-01-29 NOTE — TELEPHONE ENCOUNTER
Dr. Kaelyn Toney, patient called and stts she can not make it next week at 5:30pm on Wednesday. Pt needs morning appt.  Please advise

## 2021-02-20 RX ORDER — ERGOCALCIFEROL 1.25 MG/1
CAPSULE ORAL
Qty: 12 CAPSULE | Refills: 0 | OUTPATIENT
Start: 2021-02-20

## 2021-02-22 RX ORDER — ERGOCALCIFEROL 1.25 MG/1
50000 CAPSULE ORAL WEEKLY
Qty: 12 CAPSULE | Refills: 0 | OUTPATIENT
Start: 2021-02-22

## 2021-02-22 NOTE — TELEPHONE ENCOUNTER
Patient requesting to know why her medication was not approved. Please advised. ergocalciferol 1.25 MG (65091 UT) Oral Cap       Sig:   Take by mouth every 7 days.      Route:   Oral     Class:   Historical     Order #: [id number]

## 2021-02-22 NOTE — TELEPHONE ENCOUNTER
The patient was called and instructed needs Vitamin D lab done. The patient stated was told needs to complete the lab in April. The patient stated she has none left. I pended for 12 tablets.

## 2021-02-24 NOTE — TELEPHONE ENCOUNTER
Patient is following up. She stated she will complete labs in April per doctor's request from last appointment.

## 2021-02-24 NOTE — TELEPHONE ENCOUNTER
The patient was informed she needs to have the Vitamin D lab completed. She will have completed this week. Instructed an appointment is preferred for the lab test.  She stated she understood.

## 2021-02-27 ENCOUNTER — HOSPITAL ENCOUNTER (OUTPATIENT)
Dept: LAB | Facility: HOSPITAL | Age: 61
Discharge: HOME | End: 2021-02-27
Attending: INTERNAL MEDICINE
Payer: COMMERCIAL

## 2021-02-27 DIAGNOSIS — E55.9 VITAMIN D DEFICIENCY: ICD-10-CM

## 2021-02-27 LAB — VIT D+METAB SERPL-MCNC: 42.3 NG/ML (ref 30–100)

## 2021-02-27 PROCEDURE — 82306 VITAMIN D 25 HYDROXY: CPT

## 2021-02-27 PROCEDURE — 36415 COLL VENOUS BLD VENIPUNCTURE: CPT

## 2021-04-05 ENCOUNTER — LAB ENCOUNTER (OUTPATIENT)
Dept: LAB | Facility: HOSPITAL | Age: 61
End: 2021-04-05
Attending: INTERNAL MEDICINE
Payer: COMMERCIAL

## 2021-04-05 DIAGNOSIS — I10 ESSENTIAL HYPERTENSION: ICD-10-CM

## 2021-04-05 PROCEDURE — 80061 LIPID PANEL: CPT

## 2021-04-05 PROCEDURE — 36415 COLL VENOUS BLD VENIPUNCTURE: CPT

## 2021-04-05 PROCEDURE — 80053 COMPREHEN METABOLIC PANEL: CPT

## 2021-04-05 PROCEDURE — 85025 COMPLETE CBC W/AUTO DIFF WBC: CPT

## 2021-04-05 PROCEDURE — 83540 ASSAY OF IRON: CPT

## 2021-04-05 PROCEDURE — 83550 IRON BINDING TEST: CPT

## 2021-04-08 ENCOUNTER — OFFICE VISIT (OUTPATIENT)
Dept: INTERNAL MEDICINE CLINIC | Facility: CLINIC | Age: 61
End: 2021-04-08

## 2021-04-08 VITALS
HEIGHT: 66 IN | HEART RATE: 71 BPM | BODY MASS INDEX: 30.05 KG/M2 | WEIGHT: 187 LBS | RESPIRATION RATE: 16 BRPM | DIASTOLIC BLOOD PRESSURE: 84 MMHG | SYSTOLIC BLOOD PRESSURE: 134 MMHG

## 2021-04-08 DIAGNOSIS — I10 ESSENTIAL HYPERTENSION: ICD-10-CM

## 2021-04-08 DIAGNOSIS — D64.9 ANEMIA, UNSPECIFIED TYPE: Primary | ICD-10-CM

## 2021-04-08 DIAGNOSIS — R73.9 ELEVATED BLOOD SUGAR: ICD-10-CM

## 2021-04-08 DIAGNOSIS — I83.819 VARICOSE VEINS WITH PAIN: ICD-10-CM

## 2021-04-08 DIAGNOSIS — E78.5 HYPERLIPIDEMIA, UNSPECIFIED HYPERLIPIDEMIA TYPE: ICD-10-CM

## 2021-04-08 DIAGNOSIS — R14.0 BLOATING: ICD-10-CM

## 2021-04-08 DIAGNOSIS — E55.9 VITAMIN D DEFICIENCY: ICD-10-CM

## 2021-04-08 PROCEDURE — 3075F SYST BP GE 130 - 139MM HG: CPT | Performed by: INTERNAL MEDICINE

## 2021-04-08 PROCEDURE — 3008F BODY MASS INDEX DOCD: CPT | Performed by: INTERNAL MEDICINE

## 2021-04-08 PROCEDURE — 99214 OFFICE O/P EST MOD 30 MIN: CPT | Performed by: INTERNAL MEDICINE

## 2021-04-08 PROCEDURE — 3079F DIAST BP 80-89 MM HG: CPT | Performed by: INTERNAL MEDICINE

## 2021-04-08 RX ORDER — ACETAMINOPHEN 160 MG
2000 TABLET,DISINTEGRATING ORAL DAILY
COMMUNITY

## 2021-04-08 RX ORDER — LOSARTAN POTASSIUM 50 MG/1
50 TABLET ORAL DAILY
Qty: 90 TABLET | Refills: 1 | Status: SHIPPED | OUTPATIENT
Start: 2021-04-08 | End: 2021-08-18

## 2021-04-08 NOTE — ASSESSMENT & PLAN NOTE
Extensive bilateral varicosities. Some pain with palpation and swelling in the legs with standing. Ultrasound venous incompetence study ordered and will follow-up after completion.   May need to follow-up with interventional radiology for management after

## 2021-04-08 NOTE — ASSESSMENT & PLAN NOTE
Vitamin D levels checked in February were normal and hence advised to take an over-the-counter vitamin D at 2000 units daily.

## 2021-04-08 NOTE — ASSESSMENT & PLAN NOTE
Borderline elevation in the LDL cholesterol and persistent elevation in the triglycerides. She did these blood test immediately after [place] and hence would like to have some more time to control her diet.   Recheck labs in about 3 -4 months a

## 2021-04-08 NOTE — PROGRESS NOTES
HPI:    Patient ID: Josue Bailey is a 62year old female. Normocytic normochromic anemia per labs. Iron levels look normal at 92.   Better than what it has been in the past.  Lipid panel showed elevation in both the triglycerides and the LDL cholestero is been standing for long periods of time. Slight discoloration noted along the ankles. The quality of the pain is described as aching and dull. The pain is at a severity of 3/10. The pain is mild.  Pertinent negatives include no inability to bear weight, l mouth daily.  90 tablet 1   • PANTOPRAZOLE SODIUM 40 MG Oral Tab EC TAKE 1 TABLET BY MOUTH IN THE MORNING BEFORE BREAKFAST 90 tablet 0     Allergies:No Known Allergies      04/08/21  1122   BP: 134/84   Pulse: 71   Resp: 16     Body mass index is 30.18 kg/m muscle tone. Coordination: Coordination normal.      Gait: Gait normal.      Deep Tendon Reflexes: Reflexes normal.   Psychiatric:         Mood and Affect: Mood normal.         Behavior: Behavior normal.         Thought Content:  Thought content normal stockings as directed. Ultrasound venous insufficiency study has been requested. Continue on the same dose of medication and follow-up as directed.          Relevant Medications    losartan Potassium 50 MG Oral Tab    Other Relevant Orders    LIPID PANEL Referrals:  ONCOLOGY/HEMATOLOGY - INTERNAL  GASTRO - INTERNAL       [id number]

## 2021-04-08 NOTE — ASSESSMENT & PLAN NOTE
Colonoscopy due on 01/16/2023   Last colonoscopy in 2018 per Dr. Rosalina Steward- noted diverticulosis–few–uncomplicated and internal hemorrhoids. Patient has a family history of colon cancer in her father at about [de-identified]years of age.     EGD completed recently per

## 2021-04-08 NOTE — ASSESSMENT & PLAN NOTE
Blood pressure 134/84, pulse 71, resp. rate 16, height 5' 6\" (1.676 m), weight 187 lb (84.8 kg), not currently breastfeeding. Blood pressures are stable, well controlled at this time on losartan at 50 mg daily.   No chest pain, palpitations, shortness

## 2021-04-08 NOTE — PATIENT INSTRUCTIONS
Problem List Items Addressed This Visit        Unprioritized    Anemia - Primary     Colonoscopy due on 01/16/2023   Last colonoscopy in 2018 per Dr. Damon Henson- noted diverticulosis–few–uncomplicated and internal hemorrhoids.   Patient has a family history of co stockings as directed. Ultrasound venous insufficiency study has been requested. Continue on the same dose of medication and follow-up as directed.          Relevant Medications    losartan Potassium 50 MG Oral Tab    Other Relevant Orders    LIPID PANEL

## 2021-04-08 NOTE — ASSESSMENT & PLAN NOTE
Persistent mild elevations in the blood sugars as well as elevated triglycerides. Recheck labs with hemoglobin A1c with the next blood draw. Watch the diet for starches, sugars and desserts. Reduce portion sizes as directed.   Continue on daily exercise

## 2021-06-09 RX ORDER — PANTOPRAZOLE SODIUM 40 MG/1
TABLET, DELAYED RELEASE ORAL
Qty: 90 TABLET | Refills: 0 | Status: SHIPPED | OUTPATIENT
Start: 2021-06-09 | End: 2021-10-01

## 2021-08-18 RX ORDER — LOSARTAN POTASSIUM 50 MG/1
50 TABLET ORAL DAILY
Qty: 90 TABLET | Refills: 1 | Status: SHIPPED | OUTPATIENT
Start: 2021-08-18

## 2021-08-18 NOTE — TELEPHONE ENCOUNTER
Refilled per [facility] protocol.   Requested Prescriptions   Pending Prescriptions Disp Refills    LOSARTAN POTASSIUM 50 MG Oral Tab [Pharmacy Med Name: Losartan Potassium 50 MG Oral Tablet] 90 tablet 0     Sig: Take 1 tablet by mouth once daily Statement Selected

## 2021-10-01 RX ORDER — PANTOPRAZOLE SODIUM 40 MG/1
TABLET, DELAYED RELEASE ORAL
Qty: 90 TABLET | Refills: 0 | Status: SHIPPED | OUTPATIENT
Start: 2021-10-01

## 2021-10-01 NOTE — TELEPHONE ENCOUNTER
Requested Prescriptions     Pending Prescriptions Disp Refills   • PANTOPRAZOLE 40 MG Oral Tab EC [Pharmacy Med Name: Pantoprazole Sodium 40 MG Oral Tablet Delayed Release] 90 tablet 0     Sig: TAKE 1 TABLET BY MOUTH IN THE MORNING BEFORE BREAKFAST   Lr: 6

## 2021-11-03 ENCOUNTER — LAB ENCOUNTER (OUTPATIENT)
Dept: LAB | Facility: HOSPITAL | Age: 61
End: 2021-11-03
Attending: INTERNAL MEDICINE
Payer: COMMERCIAL

## 2021-11-03 DIAGNOSIS — I10 ESSENTIAL HYPERTENSION: ICD-10-CM

## 2021-11-03 DIAGNOSIS — R73.9 ELEVATED BLOOD SUGAR: ICD-10-CM

## 2021-11-03 DIAGNOSIS — D64.9 ANEMIA, UNSPECIFIED TYPE: ICD-10-CM

## 2021-11-03 PROCEDURE — 80053 COMPREHEN METABOLIC PANEL: CPT

## 2021-11-03 PROCEDURE — 83036 HEMOGLOBIN GLYCOSYLATED A1C: CPT

## 2021-11-03 PROCEDURE — 80061 LIPID PANEL: CPT

## 2021-11-03 PROCEDURE — 36415 COLL VENOUS BLD VENIPUNCTURE: CPT

## 2021-11-03 PROCEDURE — 85025 COMPLETE CBC W/AUTO DIFF WBC: CPT

## 2021-11-12 ENCOUNTER — TELEPHONE (OUTPATIENT)
Dept: INTERNAL MEDICINE CLINIC | Facility: CLINIC | Age: 61
End: 2021-11-12

## 2021-11-12 DIAGNOSIS — Z12.31 VISIT FOR SCREENING MAMMOGRAM: Primary | ICD-10-CM

## 2021-11-12 NOTE — TELEPHONE ENCOUNTER
Spoke to patient. She is requesting her test results and recommendations. Dr. Conrad Puga, please review test results and advise.

## 2021-11-13 NOTE — TELEPHONE ENCOUNTER
Blood count showed persistent anemia. Hemoglobin is at 11.8, marginally improved from 11.2 at the last office visit she was advised to follow-up with gastroenterology as well as hematology, upon review of chart- not done any of that.   Schedule an office v

## 2021-11-15 NOTE — TELEPHONE ENCOUNTER
Patient contacted (Name and  of pt verified). All results and recommendations reviewed. Patient verbalizes understanding, denies further questions and agrees with plan of care. Patient states she will call back to make an appointment with Antonio Mcclellan.  Julio C Farmer

## 2021-12-01 ENCOUNTER — TELEPHONE (OUTPATIENT)
Dept: INTERNAL MEDICINE CLINIC | Facility: CLINIC | Age: 61
End: 2021-12-01

## 2021-12-01 DIAGNOSIS — R14.0 BLOATING: ICD-10-CM

## 2021-12-01 DIAGNOSIS — D64.9 ANEMIA, UNSPECIFIED TYPE: Primary | ICD-10-CM

## 2021-12-02 NOTE — TELEPHONE ENCOUNTER
Colonoscopy due on 01/16/2023  Why does she want this referral for?     With her insurance ,she will need to stick with [facility]

## 2021-12-06 NOTE — TELEPHONE ENCOUNTER
Pt calling back, she states that she would like an Endoscopy and that she would like to get it done through [facility].   Please advise.

## 2021-12-08 NOTE — TELEPHONE ENCOUNTER
Cannot just request a colonoscopy because she feels like getting a colonoscopy. Colonoscopy is due in 2023. Please have her schedule an appointment to discuss any need for colonoscopy that she feels she has.   That will have to be evaluated before referral is placed

## 2021-12-21 ENCOUNTER — HOSPITAL ENCOUNTER (OUTPATIENT)
Dept: ULTRASOUND IMAGING | Facility: HOSPITAL | Age: 61
Discharge: HOME | End: 2021-12-21
Attending: INTERNAL MEDICINE
Payer: COMMERCIAL

## 2021-12-21 DIAGNOSIS — I83.819 VARICOSE VEINS WITH PAIN: ICD-10-CM

## 2021-12-21 PROCEDURE — 93970 EXTREMITY STUDY: CPT | Performed by: INTERNAL MEDICINE

## 2022-03-04 RX ORDER — LOSARTAN POTASSIUM 50 MG/1
50 TABLET ORAL DAILY
Qty: 90 TABLET | Refills: 0 | Status: SHIPPED | OUTPATIENT
Start: 2022-03-04 | End: 2022-06-17

## 2022-06-17 ENCOUNTER — TELEPHONE (OUTPATIENT)
Dept: INTERNAL MEDICINE CLINIC | Facility: CLINIC | Age: 62
End: 2022-06-17

## 2022-06-17 RX ORDER — LOSARTAN POTASSIUM 50 MG/1
50 TABLET ORAL DAILY
Qty: 90 TABLET | Refills: 0 | Status: SHIPPED | OUTPATIENT
Start: 2022-06-17

## 2022-06-17 NOTE — TELEPHONE ENCOUNTER
Patient came to the office together with her  who is my patient. She is out of medication, cannot see new physician to establish care until middle of the July, I refilled losartan for 90 days as she used to be a patient of our clinic physician Dr. Konstantin Villarreal and she will establish care with new doctor as planned.

## 2022-08-19 ENCOUNTER — LAB ENCOUNTER (OUTPATIENT)
Dept: LAB | Facility: HOSPITAL | Age: 62
End: 2022-08-19
Attending: INTERNAL MEDICINE
Payer: COMMERCIAL

## 2022-08-19 DIAGNOSIS — Z00.00 ROUTINE GENERAL MEDICAL EXAMINATION AT A HEALTH CARE FACILITY: ICD-10-CM

## 2022-08-19 LAB
ALBUMIN SERPL-MCNC: 3.8 G/DL (ref 3.4–5)
ALBUMIN/GLOB SERPL: 0.9 {RATIO} (ref 1–2)
ALP LIVER SERPL-CCNC: 72 U/L
ALT SERPL-CCNC: 24 U/L
ANION GAP SERPL CALC-SCNC: 6 MMOL/L (ref 0–18)
AST SERPL-CCNC: 13 U/L (ref 15–37)
BASOPHILS # BLD AUTO: 0.01 X10(3) UL (ref 0–0.2)
BASOPHILS NFR BLD AUTO: 0.2 %
BILIRUB SERPL-MCNC: 0.5 MG/DL (ref 0.1–2)
BUN BLD-MCNC: 14 MG/DL (ref 7–18)
CALCIUM BLD-MCNC: 9.5 MG/DL (ref 8.5–10.1)
CHLORIDE SERPL-SCNC: 106 MMOL/L (ref 98–112)
CHOLEST SERPL-MCNC: 201 MG/DL (ref ?–200)
CO2 SERPL-SCNC: 26 MMOL/L (ref 21–32)
CREAT BLD-MCNC: 0.74 MG/DL
EOSINOPHIL # BLD AUTO: 0.06 X10(3) UL (ref 0–0.7)
EOSINOPHIL NFR BLD AUTO: 1.2 %
ERYTHROCYTE [DISTWIDTH] IN BLOOD BY AUTOMATED COUNT: 13.2 %
EST. AVERAGE GLUCOSE BLD GHB EST-MCNC: 128 MG/DL (ref 68–126)
FASTING PATIENT LIPID ANSWER: YES
FASTING STATUS PATIENT QL REPORTED: YES
GFR SERPLBLD BASED ON 1.73 SQ M-ARVRAT: 92 ML/MIN/1.73M2 (ref 60–?)
GLOBULIN PLAS-MCNC: 4.2 G/DL (ref 2.8–4.4)
GLUCOSE BLD-MCNC: 103 MG/DL (ref 70–99)
HBA1C MFR BLD: 6.1 % (ref ?–5.7)
HCT VFR BLD AUTO: 38 %
HDLC SERPL-MCNC: 51 MG/DL (ref 40–59)
HGB BLD-MCNC: 12 G/DL
IMM GRANULOCYTES # BLD AUTO: 0.01 X10(3) UL (ref 0–1)
IMM GRANULOCYTES NFR BLD: 0.2 %
LDLC SERPL CALC-MCNC: 128 MG/DL (ref ?–100)
LYMPHOCYTES # BLD AUTO: 2.58 X10(3) UL (ref 1–4)
LYMPHOCYTES NFR BLD AUTO: 51.7 %
MCH RBC QN AUTO: 27.2 PG (ref 26–34)
MCHC RBC AUTO-ENTMCNC: 31.6 G/DL (ref 31–37)
MCV RBC AUTO: 86.2 FL
MONOCYTES # BLD AUTO: 0.2 X10(3) UL (ref 0.1–1)
MONOCYTES NFR BLD AUTO: 4 %
NEUTROPHILS # BLD AUTO: 2.13 X10 (3) UL (ref 1.5–7.7)
NEUTROPHILS # BLD AUTO: 2.13 X10(3) UL (ref 1.5–7.7)
NEUTROPHILS NFR BLD AUTO: 42.7 %
NONHDLC SERPL-MCNC: 150 MG/DL (ref ?–130)
OSMOLALITY SERPL CALC.SUM OF ELEC: 287 MOSM/KG (ref 275–295)
PLATELET # BLD AUTO: 211 10(3)UL (ref 150–450)
POTASSIUM SERPL-SCNC: 4.3 MMOL/L (ref 3.5–5.1)
PROT SERPL-MCNC: 8 G/DL (ref 6.4–8.2)
RBC # BLD AUTO: 4.41 X10(6)UL
SODIUM SERPL-SCNC: 138 MMOL/L (ref 136–145)
TRIGL SERPL-MCNC: 125 MG/DL (ref 30–149)
TSI SER-ACNC: 0.75 MIU/ML (ref 0.36–3.74)
VIT D+METAB SERPL-MCNC: 25.7 NG/ML (ref 30–100)
VLDLC SERPL CALC-MCNC: 22 MG/DL (ref 0–30)
WBC # BLD AUTO: 5 X10(3) UL (ref 4–11)

## 2022-08-19 PROCEDURE — 80061 LIPID PANEL: CPT

## 2022-08-19 PROCEDURE — 83036 HEMOGLOBIN GLYCOSYLATED A1C: CPT

## 2022-08-19 PROCEDURE — 80053 COMPREHEN METABOLIC PANEL: CPT

## 2022-08-19 PROCEDURE — 85025 COMPLETE CBC W/AUTO DIFF WBC: CPT

## 2022-08-19 PROCEDURE — 82306 VITAMIN D 25 HYDROXY: CPT

## 2022-08-19 PROCEDURE — 36415 COLL VENOUS BLD VENIPUNCTURE: CPT

## 2022-08-19 PROCEDURE — 84443 ASSAY THYROID STIM HORMONE: CPT

## 2022-11-01 ENCOUNTER — TELEPHONE (OUTPATIENT)
Dept: GASTROENTEROLOGY | Facility: CLINIC | Age: 62
End: 2022-11-01

## 2022-11-01 NOTE — TELEPHONE ENCOUNTER
----- Message from Kali Alvarado RN sent at 2018  9:23 AM CST -----  Regardin yr CLN recall  Entered into EPIC:Recall colon in 5 years per . Last Colon done 18, next due 23. Snapshot updated.

## 2023-04-14 PROBLEM — R55 SYNCOPE: Status: ACTIVE | Noted: 2018-12-14

## 2023-04-17 ENCOUNTER — TELEPHONE (OUTPATIENT)
Dept: OTHER | Age: 63
End: 2023-04-17

## 2023-04-17 NOTE — TELEPHONE ENCOUNTER
Patient called to ask for [employer] help. Provided her with user name but she was getting an error when she clicked on [employer]. \"    Connected her to [employer] help desk.

## 2023-06-06 ENCOUNTER — OFFICE VISIT (OUTPATIENT)
Facility: CLINIC | Age: 63
End: 2023-06-06
Payer: COMMERCIAL

## 2023-06-06 VITALS
WEIGHT: 178.63 LBS | HEART RATE: 73 BPM | BODY MASS INDEX: 28.71 KG/M2 | HEIGHT: 66 IN | DIASTOLIC BLOOD PRESSURE: 80 MMHG | SYSTOLIC BLOOD PRESSURE: 122 MMHG

## 2023-06-06 DIAGNOSIS — N81.3 COMPLETE UTEROVAGINAL PROLAPSE: Primary | ICD-10-CM

## 2023-06-06 PROCEDURE — 99203 OFFICE O/P NEW LOW 30 MIN: CPT | Performed by: STUDENT IN AN ORGANIZED HEALTH CARE EDUCATION/TRAINING PROGRAM

## 2023-06-06 PROCEDURE — 3079F DIAST BP 80-89 MM HG: CPT | Performed by: STUDENT IN AN ORGANIZED HEALTH CARE EDUCATION/TRAINING PROGRAM

## 2023-06-06 PROCEDURE — 3008F BODY MASS INDEX DOCD: CPT | Performed by: STUDENT IN AN ORGANIZED HEALTH CARE EDUCATION/TRAINING PROGRAM

## 2023-06-06 PROCEDURE — 3074F SYST BP LT 130 MM HG: CPT | Performed by: STUDENT IN AN ORGANIZED HEALTH CARE EDUCATION/TRAINING PROGRAM

## 2023-06-06 RX ORDER — MULTIVITAMIN
1 TABLET ORAL DAILY
COMMUNITY

## 2023-08-18 ENCOUNTER — TELEPHONE (OUTPATIENT)
Dept: UROLOGY | Facility: CLINIC | Age: 63
End: 2023-08-18

## 2023-08-23 ENCOUNTER — OFFICE VISIT (OUTPATIENT)
Dept: UROLOGY | Facility: CLINIC | Age: 63
End: 2023-08-23
Attending: OBSTETRICS & GYNECOLOGY
Payer: COMMERCIAL

## 2023-08-23 VITALS
SYSTOLIC BLOOD PRESSURE: 159 MMHG | HEIGHT: 66 IN | WEIGHT: 178 LBS | HEART RATE: 73 BPM | DIASTOLIC BLOOD PRESSURE: 88 MMHG | RESPIRATION RATE: 16 BRPM | TEMPERATURE: 98 F | BODY MASS INDEX: 28.61 KG/M2

## 2023-08-23 DIAGNOSIS — N81.3 COMPLETE UTEROVAGINAL PROLAPSE: Primary | ICD-10-CM

## 2023-08-23 DIAGNOSIS — N81.6 RECTOCELE: ICD-10-CM

## 2023-08-23 DIAGNOSIS — N95.2 VAGINAL ATROPHY: ICD-10-CM

## 2023-08-23 DIAGNOSIS — N81.11 CYSTOCELE, MIDLINE: ICD-10-CM

## 2023-08-23 PROCEDURE — 57160 INSERT PESSARY/OTHER DEVICE: CPT

## 2023-08-23 PROCEDURE — 99212 OFFICE O/P EST SF 10 MIN: CPT

## 2023-09-08 ENCOUNTER — LAB ENCOUNTER (OUTPATIENT)
Dept: LAB | Facility: HOSPITAL | Age: 63
End: 2023-09-08
Attending: INTERNAL MEDICINE
Payer: COMMERCIAL

## 2023-09-08 DIAGNOSIS — R73.9 HYPERGLYCEMIA: ICD-10-CM

## 2023-09-08 DIAGNOSIS — E03.9 PRIMARY HYPOTHYROIDISM: ICD-10-CM

## 2023-09-08 DIAGNOSIS — D64.9 ANEMIA, UNSPECIFIED TYPE: ICD-10-CM

## 2023-09-08 DIAGNOSIS — Z00.00 ROUTINE GENERAL MEDICAL EXAMINATION AT A HEALTH CARE FACILITY: ICD-10-CM

## 2023-09-08 DIAGNOSIS — E78.2 MIXED HYPERLIPIDEMIA: ICD-10-CM

## 2023-09-08 LAB
ALBUMIN SERPL-MCNC: 4 G/DL (ref 3.4–5)
ALBUMIN/GLOB SERPL: 1.1 {RATIO} (ref 1–2)
ALP LIVER SERPL-CCNC: 75 U/L
ALT SERPL-CCNC: 28 U/L
ANION GAP SERPL CALC-SCNC: 2 MMOL/L (ref 0–18)
AST SERPL-CCNC: 10 U/L (ref 15–37)
BASOPHILS # BLD AUTO: 0.02 X10(3) UL (ref 0–0.2)
BASOPHILS NFR BLD AUTO: 0.3 %
BILIRUB SERPL-MCNC: 0.5 MG/DL (ref 0.1–2)
BUN BLD-MCNC: 10 MG/DL (ref 7–18)
CALCIUM BLD-MCNC: 9.3 MG/DL (ref 8.5–10.1)
CHLORIDE SERPL-SCNC: 106 MMOL/L (ref 98–112)
CHOLEST SERPL-MCNC: 214 MG/DL (ref ?–200)
CO2 SERPL-SCNC: 29 MMOL/L (ref 21–32)
CREAT BLD-MCNC: 0.78 MG/DL
EGFRCR SERPLBLD CKD-EPI 2021: 86 ML/MIN/1.73M2 (ref 60–?)
EOSINOPHIL # BLD AUTO: 0.07 X10(3) UL (ref 0–0.7)
EOSINOPHIL NFR BLD AUTO: 1.2 %
ERYTHROCYTE [DISTWIDTH] IN BLOOD BY AUTOMATED COUNT: 12.8 %
EST. AVERAGE GLUCOSE BLD GHB EST-MCNC: 131 MG/DL (ref 68–126)
FASTING PATIENT LIPID ANSWER: YES
FASTING STATUS PATIENT QL REPORTED: YES
GLOBULIN PLAS-MCNC: 3.8 G/DL (ref 2.8–4.4)
GLUCOSE BLD-MCNC: 103 MG/DL (ref 70–99)
HBA1C MFR BLD: 6.2 % (ref ?–5.7)
HCT VFR BLD AUTO: 36.8 %
HDLC SERPL-MCNC: 48 MG/DL (ref 40–59)
HGB BLD-MCNC: 11.9 G/DL
IMM GRANULOCYTES # BLD AUTO: 0.01 X10(3) UL (ref 0–1)
IMM GRANULOCYTES NFR BLD: 0.2 %
LDLC SERPL CALC-MCNC: 141 MG/DL (ref ?–100)
LYMPHOCYTES # BLD AUTO: 3.13 X10(3) UL (ref 1–4)
LYMPHOCYTES NFR BLD AUTO: 53.4 %
MCH RBC QN AUTO: 27 PG (ref 26–34)
MCHC RBC AUTO-ENTMCNC: 32.3 G/DL (ref 31–37)
MCV RBC AUTO: 83.6 FL
MONOCYTES # BLD AUTO: 0.23 X10(3) UL (ref 0.1–1)
MONOCYTES NFR BLD AUTO: 3.9 %
NEUTROPHILS # BLD AUTO: 2.4 X10 (3) UL (ref 1.5–7.7)
NEUTROPHILS # BLD AUTO: 2.4 X10(3) UL (ref 1.5–7.7)
NEUTROPHILS NFR BLD AUTO: 41 %
NONHDLC SERPL-MCNC: 166 MG/DL (ref ?–130)
OSMOLALITY SERPL CALC.SUM OF ELEC: 283 MOSM/KG (ref 275–295)
PLATELET # BLD AUTO: 205 10(3)UL (ref 150–450)
POTASSIUM SERPL-SCNC: 4.1 MMOL/L (ref 3.5–5.1)
PROT SERPL-MCNC: 7.8 G/DL (ref 6.4–8.2)
RBC # BLD AUTO: 4.4 X10(6)UL
SODIUM SERPL-SCNC: 137 MMOL/L (ref 136–145)
TRIGL SERPL-MCNC: 142 MG/DL (ref 30–149)
TSI SER-ACNC: 1.19 MIU/ML (ref 0.36–3.74)
VLDLC SERPL CALC-MCNC: 26 MG/DL (ref 0–30)
WBC # BLD AUTO: 5.9 X10(3) UL (ref 4–11)

## 2023-09-08 PROCEDURE — 83036 HEMOGLOBIN GLYCOSYLATED A1C: CPT

## 2023-09-08 PROCEDURE — 84443 ASSAY THYROID STIM HORMONE: CPT

## 2023-09-08 PROCEDURE — 85025 COMPLETE CBC W/AUTO DIFF WBC: CPT

## 2023-09-08 PROCEDURE — 80053 COMPREHEN METABOLIC PANEL: CPT

## 2023-09-08 PROCEDURE — 80061 LIPID PANEL: CPT

## 2023-09-08 PROCEDURE — 36415 COLL VENOUS BLD VENIPUNCTURE: CPT

## 2023-09-14 ENCOUNTER — TELEPHONE (OUTPATIENT)
Dept: UROLOGY | Facility: CLINIC | Age: 63
End: 2023-09-14

## 2023-09-15 ENCOUNTER — OFFICE VISIT (OUTPATIENT)
Dept: UROLOGY | Facility: CLINIC | Age: 63
End: 2023-09-15
Attending: OBSTETRICS & GYNECOLOGY
Payer: COMMERCIAL

## 2023-09-15 VITALS
RESPIRATION RATE: 16 BRPM | SYSTOLIC BLOOD PRESSURE: 126 MMHG | WEIGHT: 178 LBS | DIASTOLIC BLOOD PRESSURE: 84 MMHG | BODY MASS INDEX: 29 KG/M2

## 2023-09-15 DIAGNOSIS — N81.6 RECTOCELE: ICD-10-CM

## 2023-09-15 DIAGNOSIS — N81.11 CYSTOCELE, MIDLINE: ICD-10-CM

## 2023-09-15 DIAGNOSIS — N81.3 COMPLETE UTEROVAGINAL PROLAPSE: Primary | ICD-10-CM

## 2023-09-15 LAB
BLOOD URINE: NEGATIVE
CONTROL RUN WITHIN 24 HOURS?: YES
LEUKOCYTE ESTERASE URINE: NEGATIVE
NITRITE URINE: NEGATIVE

## 2023-09-15 PROCEDURE — 51729 CYSTOMETROGRAM W/VP&UP: CPT

## 2023-09-15 PROCEDURE — 99212 OFFICE O/P EST SF 10 MIN: CPT

## 2023-09-15 PROCEDURE — 51784 ANAL/URINARY MUSCLE STUDY: CPT

## 2023-09-15 PROCEDURE — 51797 INTRAABDOMINAL PRESSURE TEST: CPT

## 2023-09-15 PROCEDURE — 81002 URINALYSIS NONAUTO W/O SCOPE: CPT

## 2023-09-15 PROCEDURE — 51741 ELECTRO-UROFLOWMETRY FIRST: CPT

## 2024-03-08 RX ORDER — SCOLOPAMINE TRANSDERMAL SYSTEM 1 MG/1
1 PATCH, EXTENDED RELEASE TRANSDERMAL ONCE
Status: CANCELLED | OUTPATIENT
Start: 2024-03-08 | End: 2024-03-08

## 2024-03-14 ENCOUNTER — TELEPHONE (OUTPATIENT)
Dept: UROLOGY | Facility: HOSPITAL | Age: 64
End: 2024-03-14

## 2024-03-14 NOTE — TELEPHONE ENCOUNTER
TC to pt returning message left on nursing line. Pt requesting to reschedule her appt with Dr. Gloria at [facility] tmrw. Phone number to [facility] office provided to pt. Informed pt she would have to contact that office in order to reschedule. Pt verbalized understanding.

## 2024-03-23 ENCOUNTER — EKG ENCOUNTER (OUTPATIENT)
Dept: LAB | Facility: HOSPITAL | Age: 64
End: 2024-03-23
Attending: INTERNAL MEDICINE
Payer: COMMERCIAL

## 2024-03-23 DIAGNOSIS — Z01.818 PRE-OP EVALUATION: ICD-10-CM

## 2024-03-23 DIAGNOSIS — Z00.00 ROUTINE GENERAL MEDICAL EXAMINATION AT A HEALTH CARE FACILITY: ICD-10-CM

## 2024-03-23 LAB
ANION GAP SERPL CALC-SCNC: 3 MMOL/L (ref 0–18)
ATRIAL RATE: 63 BPM
BASOPHILS # BLD AUTO: 0.02 X10(3) UL (ref 0–0.2)
BASOPHILS NFR BLD AUTO: 0.4 %
BUN BLD-MCNC: 11 MG/DL (ref 9–23)
CALCIUM BLD-MCNC: 9.7 MG/DL (ref 8.5–10.1)
CHLORIDE SERPL-SCNC: 109 MMOL/L (ref 98–112)
CO2 SERPL-SCNC: 29 MMOL/L (ref 21–32)
CREAT BLD-MCNC: 0.74 MG/DL
EGFRCR SERPLBLD CKD-EPI 2021: 91 ML/MIN/1.73M2 (ref 60–?)
EOSINOPHIL # BLD AUTO: 0.05 X10(3) UL (ref 0–0.7)
EOSINOPHIL NFR BLD AUTO: 0.9 %
ERYTHROCYTE [DISTWIDTH] IN BLOOD BY AUTOMATED COUNT: 13.1 %
FASTING STATUS PATIENT QL REPORTED: YES
GLUCOSE BLD-MCNC: 104 MG/DL (ref 70–99)
HCT VFR BLD AUTO: 37.2 %
HGB BLD-MCNC: 11.4 G/DL
IMM GRANULOCYTES # BLD AUTO: 0.01 X10(3) UL (ref 0–1)
IMM GRANULOCYTES NFR BLD: 0.2 %
LYMPHOCYTES # BLD AUTO: 2.72 X10(3) UL (ref 1–4)
LYMPHOCYTES NFR BLD AUTO: 51.3 %
MCH RBC QN AUTO: 26.5 PG (ref 26–34)
MCHC RBC AUTO-ENTMCNC: 30.6 G/DL (ref 31–37)
MCV RBC AUTO: 86.3 FL
MONOCYTES # BLD AUTO: 0.28 X10(3) UL (ref 0.1–1)
MONOCYTES NFR BLD AUTO: 5.3 %
NEUTROPHILS # BLD AUTO: 2.22 X10 (3) UL (ref 1.5–7.7)
NEUTROPHILS # BLD AUTO: 2.22 X10(3) UL (ref 1.5–7.7)
NEUTROPHILS NFR BLD AUTO: 41.9 %
OSMOLALITY SERPL CALC.SUM OF ELEC: 292 MOSM/KG (ref 275–295)
P AXIS: 24 DEGREES
P-R INTERVAL: 176 MS
PLATELET # BLD AUTO: 236 10(3)UL (ref 150–450)
POTASSIUM SERPL-SCNC: 4.3 MMOL/L (ref 3.5–5.1)
Q-T INTERVAL: 426 MS
QRS DURATION: 100 MS
QTC CALCULATION (BEZET): 435 MS
R AXIS: 43 DEGREES
RBC # BLD AUTO: 4.31 X10(6)UL
SODIUM SERPL-SCNC: 141 MMOL/L (ref 136–145)
T AXIS: 29 DEGREES
VENTRICULAR RATE: 63 BPM
WBC # BLD AUTO: 5.3 X10(3) UL (ref 4–11)

## 2024-03-23 PROCEDURE — 36415 COLL VENOUS BLD VENIPUNCTURE: CPT

## 2024-03-23 PROCEDURE — 80048 BASIC METABOLIC PNL TOTAL CA: CPT

## 2024-03-23 PROCEDURE — 93010 ELECTROCARDIOGRAM REPORT: CPT | Performed by: STUDENT IN AN ORGANIZED HEALTH CARE EDUCATION/TRAINING PROGRAM

## 2024-03-23 PROCEDURE — 93005 ELECTROCARDIOGRAM TRACING: CPT

## 2024-03-23 PROCEDURE — 85025 COMPLETE CBC W/AUTO DIFF WBC: CPT

## 2024-04-08 ENCOUNTER — HOSPITAL ENCOUNTER (OUTPATIENT)
Facility: HOSPITAL | Age: 64
Discharge: HOME | End: 2024-04-09
Attending: OBSTETRICS & GYNECOLOGY | Admitting: OBSTETRICS & GYNECOLOGY
Payer: COMMERCIAL

## 2024-04-08 ENCOUNTER — ANESTHESIA EVENT (OUTPATIENT)
Dept: SURGERY | Facility: HOSPITAL | Age: 64
End: 2024-04-08
Payer: COMMERCIAL

## 2024-04-08 ENCOUNTER — ANESTHESIA (OUTPATIENT)
Dept: SURGERY | Facility: HOSPITAL | Age: 64
End: 2024-04-08
Payer: COMMERCIAL

## 2024-04-08 DIAGNOSIS — Z98.890 POSTOPERATIVE STATE: Primary | ICD-10-CM

## 2024-04-08 PROCEDURE — 0USG0ZZ REPOSITION VAGINA, OPEN APPROACH: ICD-10-PCS | Performed by: OBSTETRICS & GYNECOLOGY

## 2024-04-08 PROCEDURE — 88305 TISSUE EXAM BY PATHOLOGIST: CPT | Performed by: OBSTETRICS & GYNECOLOGY

## 2024-04-08 PROCEDURE — 0UT97ZZ RESECTION OF UTERUS, VIA NATURAL OR ARTIFICIAL OPENING: ICD-10-PCS | Performed by: OBSTETRICS & GYNECOLOGY

## 2024-04-08 PROCEDURE — 0JQC0ZZ REPAIR PELVIC REGION SUBCUTANEOUS TISSUE AND FASCIA, OPEN APPROACH: ICD-10-PCS | Performed by: OBSTETRICS & GYNECOLOGY

## 2024-04-08 RX ORDER — DIPHENHYDRAMINE HYDROCHLORIDE 50 MG/ML
12.5 INJECTION INTRAMUSCULAR; INTRAVENOUS EVERY 4 HOURS PRN
Status: DISCONTINUED | OUTPATIENT
Start: 2024-04-08 | End: 2024-04-09

## 2024-04-08 RX ORDER — HYDROMORPHONE HYDROCHLORIDE 1 MG/ML
0.4 INJECTION, SOLUTION INTRAMUSCULAR; INTRAVENOUS; SUBCUTANEOUS EVERY 5 MIN PRN
Status: DISCONTINUED | OUTPATIENT
Start: 2024-04-08 | End: 2024-04-08 | Stop reason: HOSPADM

## 2024-04-08 RX ORDER — IBUPROFEN 600 MG/1
600 TABLET ORAL EVERY 6 HOURS SCHEDULED
Status: DISCONTINUED | OUTPATIENT
Start: 2024-04-09 | End: 2024-04-09

## 2024-04-08 RX ORDER — ONDANSETRON 2 MG/ML
INJECTION INTRAMUSCULAR; INTRAVENOUS AS NEEDED
Status: DISCONTINUED | OUTPATIENT
Start: 2024-04-08 | End: 2024-04-08 | Stop reason: SURG

## 2024-04-08 RX ORDER — HYDROMORPHONE HYDROCHLORIDE 1 MG/ML
0.8 INJECTION, SOLUTION INTRAMUSCULAR; INTRAVENOUS; SUBCUTANEOUS EVERY 2 HOUR PRN
Status: DISCONTINUED | OUTPATIENT
Start: 2024-04-08 | End: 2024-04-09

## 2024-04-08 RX ORDER — OXYCODONE HYDROCHLORIDE 10 MG/1
10 TABLET ORAL EVERY 4 HOURS PRN
Status: DISCONTINUED | OUTPATIENT
Start: 2024-04-08 | End: 2024-04-09

## 2024-04-08 RX ORDER — PROCHLORPERAZINE EDISYLATE 5 MG/ML
5 INJECTION INTRAMUSCULAR; INTRAVENOUS EVERY 8 HOURS PRN
Status: DISCONTINUED | OUTPATIENT
Start: 2024-04-08 | End: 2024-04-08 | Stop reason: HOSPADM

## 2024-04-08 RX ORDER — ACETAMINOPHEN 500 MG
1000 TABLET ORAL ONCE
Status: DISCONTINUED | OUTPATIENT
Start: 2024-04-08 | End: 2024-04-08 | Stop reason: HOSPADM

## 2024-04-08 RX ORDER — LOSARTAN POTASSIUM 100 MG/1
100 TABLET ORAL NIGHTLY
Status: DISCONTINUED | OUTPATIENT
Start: 2024-04-09 | End: 2024-04-09

## 2024-04-08 RX ORDER — ONDANSETRON 2 MG/ML
4 INJECTION INTRAMUSCULAR; INTRAVENOUS EVERY 8 HOURS PRN
Status: DISCONTINUED | OUTPATIENT
Start: 2024-04-08 | End: 2024-04-09

## 2024-04-08 RX ORDER — DEXAMETHASONE SODIUM PHOSPHATE 4 MG/ML
VIAL (ML) INJECTION AS NEEDED
Status: DISCONTINUED | OUTPATIENT
Start: 2024-04-08 | End: 2024-04-08 | Stop reason: SURG

## 2024-04-08 RX ORDER — HYDROMORPHONE HYDROCHLORIDE 1 MG/ML
0.2 INJECTION, SOLUTION INTRAMUSCULAR; INTRAVENOUS; SUBCUTANEOUS EVERY 5 MIN PRN
Status: DISCONTINUED | OUTPATIENT
Start: 2024-04-08 | End: 2024-04-08 | Stop reason: HOSPADM

## 2024-04-08 RX ORDER — ONDANSETRON 2 MG/ML
4 INJECTION INTRAMUSCULAR; INTRAVENOUS EVERY 6 HOURS PRN
Status: DISCONTINUED | OUTPATIENT
Start: 2024-04-08 | End: 2024-04-08 | Stop reason: HOSPADM

## 2024-04-08 RX ORDER — HYDROMORPHONE HYDROCHLORIDE 1 MG/ML
0.4 INJECTION, SOLUTION INTRAMUSCULAR; INTRAVENOUS; SUBCUTANEOUS EVERY 2 HOUR PRN
Status: DISCONTINUED | OUTPATIENT
Start: 2024-04-08 | End: 2024-04-09

## 2024-04-08 RX ORDER — NEOSTIGMINE METHYLSULFATE 1 MG/ML
INJECTION, SOLUTION INTRAVENOUS AS NEEDED
Status: DISCONTINUED | OUTPATIENT
Start: 2024-04-08 | End: 2024-04-08 | Stop reason: SURG

## 2024-04-08 RX ORDER — HYDROCODONE BITARTRATE AND ACETAMINOPHEN 10; 325 MG/1; MG/1
1 TABLET ORAL ONCE AS NEEDED
Status: DISCONTINUED | OUTPATIENT
Start: 2024-04-08 | End: 2024-04-08 | Stop reason: HOSPADM

## 2024-04-08 RX ORDER — CEFAZOLIN SODIUM/WATER 2 G/20 ML
2 SYRINGE (ML) INTRAVENOUS ONCE
Status: COMPLETED | OUTPATIENT
Start: 2024-04-08 | End: 2024-04-08

## 2024-04-08 RX ORDER — ACETAMINOPHEN 500 MG
1000 TABLET ORAL ONCE AS NEEDED
Status: DISCONTINUED | OUTPATIENT
Start: 2024-04-08 | End: 2024-04-08 | Stop reason: HOSPADM

## 2024-04-08 RX ORDER — GLYCOPYRROLATE 0.2 MG/ML
INJECTION, SOLUTION INTRAMUSCULAR; INTRAVENOUS AS NEEDED
Status: DISCONTINUED | OUTPATIENT
Start: 2024-04-08 | End: 2024-04-08 | Stop reason: SURG

## 2024-04-08 RX ORDER — EPHEDRINE SULFATE 50 MG/ML
INJECTION INTRAVENOUS AS NEEDED
Status: DISCONTINUED | OUTPATIENT
Start: 2024-04-08 | End: 2024-04-08 | Stop reason: SURG

## 2024-04-08 RX ORDER — LIDOCAINE HYDROCHLORIDE 10 MG/ML
INJECTION, SOLUTION EPIDURAL; INFILTRATION; INTRACAUDAL; PERINEURAL AS NEEDED
Status: DISCONTINUED | OUTPATIENT
Start: 2024-04-08 | End: 2024-04-08 | Stop reason: SURG

## 2024-04-08 RX ORDER — HYDROMORPHONE HYDROCHLORIDE 1 MG/ML
0.6 INJECTION, SOLUTION INTRAMUSCULAR; INTRAVENOUS; SUBCUTANEOUS EVERY 5 MIN PRN
Status: DISCONTINUED | OUTPATIENT
Start: 2024-04-08 | End: 2024-04-08 | Stop reason: HOSPADM

## 2024-04-08 RX ORDER — NALOXONE HYDROCHLORIDE 0.4 MG/ML
0.08 INJECTION, SOLUTION INTRAMUSCULAR; INTRAVENOUS; SUBCUTANEOUS AS NEEDED
Status: DISCONTINUED | OUTPATIENT
Start: 2024-04-08 | End: 2024-04-08 | Stop reason: HOSPADM

## 2024-04-08 RX ORDER — KETOROLAC TROMETHAMINE 30 MG/ML
INJECTION, SOLUTION INTRAMUSCULAR; INTRAVENOUS AS NEEDED
Status: DISCONTINUED | OUTPATIENT
Start: 2024-04-08 | End: 2024-04-08 | Stop reason: SURG

## 2024-04-08 RX ORDER — ROCURONIUM BROMIDE 10 MG/ML
INJECTION, SOLUTION INTRAVENOUS AS NEEDED
Status: DISCONTINUED | OUTPATIENT
Start: 2024-04-08 | End: 2024-04-08 | Stop reason: SURG

## 2024-04-08 RX ORDER — OXYCODONE HYDROCHLORIDE 10 MG/1
5 TABLET ORAL EVERY 4 HOURS PRN
Status: DISCONTINUED | OUTPATIENT
Start: 2024-04-08 | End: 2024-04-09

## 2024-04-08 RX ORDER — KETOROLAC TROMETHAMINE 15 MG/ML
15 INJECTION, SOLUTION INTRAMUSCULAR; INTRAVENOUS EVERY 6 HOURS
Qty: 4 ML | Refills: 0 | Status: DISCONTINUED | OUTPATIENT
Start: 2024-04-08 | End: 2024-04-09

## 2024-04-08 RX ORDER — METOCLOPRAMIDE HYDROCHLORIDE 5 MG/ML
INJECTION INTRAMUSCULAR; INTRAVENOUS AS NEEDED
Status: DISCONTINUED | OUTPATIENT
Start: 2024-04-08 | End: 2024-04-08 | Stop reason: SURG

## 2024-04-08 RX ORDER — LOSARTAN POTASSIUM 100 MG/1
100 TABLET ORAL DAILY
Status: DISCONTINUED | OUTPATIENT
Start: 2024-04-08 | End: 2024-04-08

## 2024-04-08 RX ORDER — ONDANSETRON 4 MG/1
4 TABLET, FILM COATED ORAL EVERY 8 HOURS PRN
Status: DISCONTINUED | OUTPATIENT
Start: 2024-04-08 | End: 2024-04-09

## 2024-04-08 RX ORDER — SODIUM CHLORIDE, SODIUM LACTATE, POTASSIUM CHLORIDE, CALCIUM CHLORIDE 600; 310; 30; 20 MG/100ML; MG/100ML; MG/100ML; MG/100ML
INJECTION, SOLUTION INTRAVENOUS CONTINUOUS
Status: DISCONTINUED | OUTPATIENT
Start: 2024-04-08 | End: 2024-04-09

## 2024-04-08 RX ORDER — HYDROMORPHONE HYDROCHLORIDE 1 MG/ML
INJECTION, SOLUTION INTRAMUSCULAR; INTRAVENOUS; SUBCUTANEOUS
Status: COMPLETED
Start: 2024-04-08 | End: 2024-04-08

## 2024-04-08 RX ORDER — HYDROCODONE BITARTRATE AND ACETAMINOPHEN 10; 325 MG/1; MG/1
2 TABLET ORAL ONCE AS NEEDED
Status: DISCONTINUED | OUTPATIENT
Start: 2024-04-08 | End: 2024-04-08 | Stop reason: HOSPADM

## 2024-04-08 RX ORDER — BUPIVACAINE HYDROCHLORIDE AND EPINEPHRINE 2.5; 5 MG/ML; UG/ML
INJECTION, SOLUTION EPIDURAL; INFILTRATION; INTRACAUDAL; PERINEURAL AS NEEDED
Status: DISCONTINUED | OUTPATIENT
Start: 2024-04-08 | End: 2024-04-08 | Stop reason: HOSPADM

## 2024-04-08 RX ORDER — ENOXAPARIN SODIUM 100 MG/ML
40 INJECTION SUBCUTANEOUS DAILY
Status: DISCONTINUED | OUTPATIENT
Start: 2024-04-09 | End: 2024-04-09

## 2024-04-08 RX ORDER — SODIUM CHLORIDE, SODIUM LACTATE, POTASSIUM CHLORIDE, CALCIUM CHLORIDE 600; 310; 30; 20 MG/100ML; MG/100ML; MG/100ML; MG/100ML
INJECTION, SOLUTION INTRAVENOUS CONTINUOUS
Status: DISCONTINUED | OUTPATIENT
Start: 2024-04-08 | End: 2024-04-08 | Stop reason: HOSPADM

## 2024-04-08 RX ADMIN — DEXAMETHASONE SODIUM PHOSPHATE 4 MG: 4 MG/ML VIAL (ML) INJECTION at 12:40:00

## 2024-04-08 RX ADMIN — METOCLOPRAMIDE HYDROCHLORIDE 10 MG: 5 INJECTION INTRAMUSCULAR; INTRAVENOUS at 12:40:00

## 2024-04-08 RX ADMIN — LIDOCAINE HYDROCHLORIDE 50 MG: 10 INJECTION, SOLUTION EPIDURAL; INFILTRATION; INTRACAUDAL; PERINEURAL at 12:31:00

## 2024-04-08 RX ADMIN — GLYCOPYRROLATE 0.6 MG: 0.2 INJECTION, SOLUTION INTRAMUSCULAR; INTRAVENOUS at 13:59:00

## 2024-04-08 RX ADMIN — CEFAZOLIN SODIUM/WATER 2 G: 2 G/20 ML SYRINGE (ML) INTRAVENOUS at 12:39:00

## 2024-04-08 RX ADMIN — ROCURONIUM BROMIDE 40 MG: 10 INJECTION, SOLUTION INTRAVENOUS at 12:31:00

## 2024-04-08 RX ADMIN — EPHEDRINE SULFATE 5 MG: 50 INJECTION INTRAVENOUS at 13:08:00

## 2024-04-08 RX ADMIN — SODIUM CHLORIDE, SODIUM LACTATE, POTASSIUM CHLORIDE, CALCIUM CHLORIDE: 600; 310; 30; 20 INJECTION, SOLUTION INTRAVENOUS at 13:41:00

## 2024-04-08 RX ADMIN — KETOROLAC TROMETHAMINE 30 MG: 30 INJECTION, SOLUTION INTRAMUSCULAR; INTRAVENOUS at 13:44:00

## 2024-04-08 RX ADMIN — NEOSTIGMINE METHYLSULFATE 4 MG: 1 INJECTION, SOLUTION INTRAVENOUS at 13:59:00

## 2024-04-08 RX ADMIN — SODIUM CHLORIDE, SODIUM LACTATE, POTASSIUM CHLORIDE, CALCIUM CHLORIDE: 600; 310; 30; 20 INJECTION, SOLUTION INTRAVENOUS at 12:26:00

## 2024-04-08 RX ADMIN — EPHEDRINE SULFATE 5 MG: 50 INJECTION INTRAVENOUS at 13:02:00

## 2024-04-08 RX ADMIN — ONDANSETRON 4 MG: 2 INJECTION INTRAMUSCULAR; INTRAVENOUS at 13:42:00

## 2024-04-08 NOTE — DISCHARGE INSTRUCTIONS
Pelvic Medicine Postoperative Instructions:    1. AVOID CONSTIPATION:   -Take Miralax one capful in water or juice each morning.  You can also take each evening if needed.  -Take Fiber supplement along with Miralax as well.  2. No lifting over 10 pounds (1 gallon of mild is 8 pounds).  3. Showers and tub baths are okay, even if you have a catheter or abdominal incision.  4. You may ride in a car immediately.  5. You may drive after 1-2 weeks.    Please call us for any of the following:  -Temperature above 100.5 for 4 hours or above 101.0 at any time.  -Chest pain or trouble breathing.  -Vaginal bleeding heavier than a period.  -Redness, tenderness or swelling of your legs  -Pain or burning when you urinate; or if you go home with a catheter, trouble with catheter draining.  -Redness, pain or foul discharge from incision.    Office telephone: [phone]  After hours: [phone]

## 2024-04-08 NOTE — ANESTHESIA PREPROCEDURE EVALUATION
PRE-OP EVALUATION    Patient Name: Laurie Gracia    Admit Diagnosis: rectocele, uterovaginal prolapse,cystocele    Pre-op Diagnosis: rectocele, uterovaginal prolapse,cystocele    TOTAL VAGINAL HYSTERECTOMY, POSSIBLE BILATERAL SALPINGECTOMY,UTEROSACRAL LIGAMENT SUSPENSION, ANTERIOR POSTERIOR, ENTEROCELE REPAIR, CYSTOSCOPY    Anesthesia Procedure: TOTAL VAGINAL HYSTERECTOMY, POSSIBLE BILATERAL SALPINGECTOMY,UTEROSACRAL LIGAMENT SUSPENSION, ANTERIOR POSTERIOR, ENTEROCELE REPAIR, CYSTOSCOPY (Bilateral)    Surgeon(s) and Role:     * Maria L Gloria MD - Primary    Pre-op vitals reviewed.        Body mass index is 29.54 kg/m².    Current medications reviewed.  Hospital Medications:  No current facility-administered medications on file as of .       Outpatient Medications:     No medications prior to admission.       Allergies: Patient has no known allergies.      Anesthesia Evaluation    Patient summary reviewed.    Anesthetic Complications  (-) history of anesthetic complications         GI/Hepatic/Renal    Negative GI/hepatic/renal ROS.                             Cardiovascular            MET: >4    (+) obesity  (+) hypertension                       (-) angina     (-) ROE         Endo/Other    Negative endo/other ROS.                              Pulmonary    Negative pulmonary ROS.                       Neuro/Psych    Negative neuro/psych ROS.                                  Past Surgical History:   Procedure Laterality Date    COLONOSCOPY  2012    COLONOSCOPY N/A 1/16/2018    Procedure: COLONOSCOPY;  Surgeon: Sharlene De La Torre MD;  Location: [facility] ENDO    LIPOMA REMOVAL       Social History     Socioeconomic History    Marital status:    Tobacco Use    Smoking status: Never    Smokeless tobacco: Never   Vaping Use    Vaping Use: Never used   Substance and Sexual Activity    Alcohol use: Never     Alcohol/week: 0.0 standard drinks of alcohol    Drug use: Never    Sexual activity: Not Currently      Partners: Male   Other Topics Concern    Caffeine Concern No    Exercise Yes     Comment: 30 min walking daily     History   Drug Use Unknown     Available pre-op labs reviewed.  Lab Results   Component Value Date    WBC 5.3 03/23/2024    RBC 4.31 03/23/2024    HGB 11.4 (L) 03/23/2024    HCT 37.2 03/23/2024    MCV 86.3 03/23/2024    MCH 26.5 03/23/2024    MCHC 30.6 (L) 03/23/2024    RDW 13.1 03/23/2024    .0 03/23/2024     Lab Results   Component Value Date     03/23/2024    K 4.3 03/23/2024     03/23/2024    CO2 29.0 03/23/2024    BUN 11 03/23/2024    CREATSERUM 0.74 03/23/2024     (H) 03/23/2024    CA 9.7 03/23/2024            Airway      Mallampati: II  Mouth opening: >3 FB  TM distance: 4 - 6 cm  Neck ROM: full Cardiovascular    Cardiovascular exam normal.         Dental    Dentition appears grossly intact         Pulmonary    Pulmonary exam normal.                 Other findings              ASA: 2   Plan: general  NPO status verified and patient meets guidelines.    Post-procedure pain management plan discussed with surgeon and patient.    Comment: Discussed risks including PONV, sore throat, dental damage, cardiac and respiratory complications. All questions answered.  Plan/risks discussed with: patient  Use of blood product(s) discussed with: patient              Present on Admission:   Complete uterovaginal prolapse   Cystocele, midline   Rectocele

## 2024-04-08 NOTE — PROGRESS NOTES
NURSING ADMISSION NOTE      Patient admitted via  BED  from PACU  Oriented to room.  Safety precautions initiated.  Bed in low position.  Call light in reach.    Alert & oriented x4.with tolerable pain.No nausea or vomiting.Peripad with scant sanguinous drainage.Fontenot catheter intact draining blue tinged urine(from dye used). Plan of care discussed with patient. All questions and concerns addressed.

## 2024-04-08 NOTE — INTERVAL H&P NOTE
Pre-op Diagnosis: rectocele, uterovaginal prolapse,cystocele    The above referenced H&P was reviewed by Maria L Gloria MD on 4/8/2024, the patient was examined and no significant changes have occurred in the patient's condition since the H&P was performed.  I discussed with the patient and/or legal representative the potential benefits, risks and side effects of this procedure; the likelihood of the patient achieving goals; and potential problems that might occur during recuperation.  I discussed reasonable alternatives to the procedure, including risks, benefits and side effects related to the alternatives and risks related to not receiving this procedure.  We will proceed with procedure as planned.

## 2024-04-08 NOTE — ANESTHESIA PROCEDURE NOTES
Airway  Date/Time: 4/8/2024 12:33 PM  Urgency: elective      General Information and Staff    Patient location during procedure: OR  Anesthesiologist: Billie Stoddard MD  Performed: anesthesiologist   Performed by: Billie Stoddard MD  Authorized by: Billie Stoddard MD      Indications and Patient Condition  Indications for airway management: anesthesia  Sedation level: deep  Preoxygenated: yes  Patient position: sniffing  Mask difficulty assessment: 1 - vent by mask    Final Airway Details  Final airway type: endotracheal airway      Successful airway: ETT  Cuffed: yes   Successful intubation technique: direct laryngoscopy  Endotracheal tube insertion site: oral  Blade: Taty  Blade size: #3  ETT size (mm): 7.0    Cormack-Lehane Classification: grade I - full view of glottis  Placement verified by: capnometry   Measured from: lips  ETT to lips (cm): 21  Number of attempts at approach: 1

## 2024-04-08 NOTE — BRIEF OP NOTE
Pre-Operative Diagnosis:  uterovaginal prolapse,cystocele, rectocele     Post-Operative Diagnosis: uterovaginal prolapse,cystocele , rectocele     Procedure Performed:   TOTAL VAGINAL HYSTERECTOMY, UTEROSACRAL LIGAMENT SUSPENSION, ANTERIOR POSTERIOR, ENTEROCELE REPAIR, CYSTOSCOPY    Surgeon(s) and Role:     * Maria L Gloria MD - Primary    Assistant(s):  Surgical Assistant.: Addie Hermosillo     Surgical Findings: Stage 3 uterovaginal prolapse, cystocele, rectocele.  Normal bladder     Specimen: Uterus, cervix.     Estimated Blood Loss: Blood Output: 100 mL (4/8/2024  1:55 PM)        Maria L Gloria MD  4/8/2024  2:02 PM

## 2024-04-08 NOTE — OPERATIVE REPORT
Laurie Gracia  : 1960  MRN: [id number]  Date of Surgery: 2024    Pre-operative diagnosis:  1. Incomplete uterovaginal prolapse, cystocele, rectocele, enterocele    Post-operative diagnosis:  1. Incomplete uterovaginal prolapse, cystocele, rectocele, enterocele    Procedures:    Total vaginal hysterectomy, repair of enterocele; bilateral uterosacral ligament suspension; anterior colporrhaphy; posterior colporrhaphy, cystoscopy.     Surgeon:  Maria L Gloria MD     Assistant:  Addie Hermosillo CSA    Location: [place] [address]     Anesthesia:  General endotracheal     Estimated blood loss:  100 ml      Complications: None    Drains: Transurethral Fontenot catheter     Specimens: Uterus, cervix.     Findings: Stage 3 uterovaginal prolapse with associated cystocele, rectocele and enterocele. Bilateral ureteral efflux. Hemostasis upon completion of the case.      Description of the Procedure:  The patient was taken to operating room 5. General anesthesia was obtained without difficulty. The patient was then placed in the dorsal lithotomy position in [facility]. Surgical time out was performed. The ring pessary was removed and discarded.     She was then prepped and draped in the usual sterile fashion. The Magrina vaginal retractor was utilized and retraction was placed into the vagina.  The cervix was grasped with tenacula a circumferential incision was made around the cervix. The anterior and posterior cul-de-sacs were entered without difficulty. The uterosacral and cardinal ligament complexes, and uterine vascular pedicles were clamped, cut and suture ligated bilaterally. The uterus was delivered and the uteroovarian complexes were clamped and cut, and suture ligated, freeing the specimen.  The tubes and ovaries her high in the pelvis and could not be retrieved. They palpated normal. Hemostasis was confirmed on all pedicles.   Due to the presence of significant apical prolapse and associated  enterocele, it was necessary to proceed with suspension of the vaginal apex and enterocele repair. The bowel was packed away. The uterosacral ligaments were identified. A Patel culdoplasty was performed first. Utilizing 1-0 Vicryl suture, a Patel stitch was placed through the posterior vaginal wall, into the distal left uterosacral ligament, across the posterior cul-de-sac peritoneum, through the distal right uterosacral ligament and back through the posterior vagina in order to repair the enterocele. This suture was held in tension. Following this, two uterosacral suspension stitches were then placed using 1-0 Vicryl sutures on each side of the pelvis. These were placed high on the uterosacral ligaments at and just proximal to the ischial spine. Once these sutures were placed and held in tension, cystoscopy was performed with a 70-degree scope. The patient had been given 5 ml of IV indigo carmine dye. Cystoscopy confirmed that there had been no injury to the bladder. There was noted to be prompt flow of blue-stained urine from both ureteral orifices, confirming ureteral patency.     Following this, Allis clamps were used to grasp the redundant anterior vaginal epithelium in the midline and a midline incision was made after infiltration of 0.25% Marcaine with epinephrine.  The redundant epithelium was dissected from the underlying endopelvic connective tissue of the bladder and 0 Vicryl sutures were then used to plicate the endopelvic connective tissue, obliterating the large cystocele. The excess vaginal epithelium was excised and the midline incision was then closed with 2-0 Vicryl suture in running locking fashion.     The lap sponge was removed from the peritoneal cavity and the vaginal apex was then closed with 2-0 Vicryl suture in a running locking fashion. The uterosacral and Patel sutures were then tied down resulting in excellent elevation of the vaginal apex and correction of the enterocele. Cystoscopy  was repeated and ureteral patency was confirmed once again.  There were not bladder lesions.     Attention was then directed to the posterior compartment. The posterior vaginal epithelium was excised after infiltration with Marcaine and epinephrine. Then utilizing interrupted No. 0 Vicryl; the endopelvic fibromuscular tissue was plicated in the midline in the standard fashion. The vaginal epithelium was reaproximated with 2-0 Vicryl in a running fashion. She had a well-supported perineum. Inspection at this point revealed a well-supported vaginal apex, anterior and posterior compartments, with good hemostasis. Rectal examination confirmed no injury to the rectum.     A transurethral Fontenot catheter was left in place. The patient was then removed from the dorsal lithotomy position, awakened and extubated and transferred from the operating room to the recovery room in stable condition, having tolerated the procedure well. Sponge, lap, & needle counts were correct.     Maria L Gloria MD

## 2024-04-08 NOTE — H&P
[facility] INPATIENT H&P   GYN ADMISSION NOTE    ADMISSION DATE:  2024  ADMITTING PHYSICIAN:  Maria L Gloria MD  ATTENDING PHYSICIAN:  Maria L Gloria MD  PRIMARY CARE PHYSICIAN:  Hernan Mendoza MD  CODE STATUS:  Full Code    CHIEF COMPLAINT/REASON FOR ADMISSION:  Vaginal prolapse     HISTORY OF PRESENT ILLNESS:    Laurie Gracia is a 63 year old,  (vaginal deliveries), who  presented for evaluation of vaginal prolapse for a few months. Has to reduce to empty her bladder.  Rare UUI  Had BELEN in the past but not now.  No UTIs  Not sexually active  PMHx:  + HTN, diverticulosis.     Initial exam demonstrated + UGA, stage 4 uterovaginal prolapse, cystocele, rectocele.  She was fitted with #4 ring pessary with support.  She is interested in surgery. Has been scheduled on 24.  Using pessary in the meantime.       Had urodynamic testing on 9/15/23.  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 No demonstrable BELEN with cough or Valsalva at any volumes tested with or without prolapse reduced. Pressure/flow: Voided 790 mL with PVR of 20 mL.     MEDICATIONS PRIOR TO ADMISSION:    No current outpatient medications on file.       ALLERGIES:    No Known Allergies    PAST MEDICAL HISTORY:    Past Medical History:   Diagnosis Date    Diverticulosis     Diverticulosis large intestine w/o perforation or abscess w/o bleeding 2018    Dry eye     Schirmer's test 6mm OU    Epiphora, bilateral     OD-total blockage, OS partial blockage    Hemorrhoids     High blood pressure     Internal hemorrhoids 2018    MGD (meibomian gland dysfunction)        SURGICAL HISTORY:    Past Surgical History:   Procedure Laterality Date    COLONOSCOPY      COLONOSCOPY N/A 2018    Procedure: COLONOSCOPY;  Surgeon: Sharlene De La Torre MD;  Location: [facility] ENDO    LIPOMA REMOVAL         FAMILY HISTORY:    Family History   Problem Relation Age of Onset     Colon Cancer Father 77    Stroke Father 78    Cancer Father 80        colon    Other (Other) Father 90        unknown cause of death    Diabetes Mother         unknown       SOCIAL HISTORY:    Social History     Socioeconomic History    Marital status:    Tobacco Use    Smoking status: Never    Smokeless tobacco: Never   Vaping Use    Vaping Use: Never used   Substance and Sexual Activity    Alcohol use: Never     Alcohol/week: 0.0 standard drinks of alcohol    Drug use: Never    Sexual activity: Not Currently     Partners: Male   Other Topics Concern    Caffeine Concern No    Exercise Yes     Comment: 30 min walking daily       REVIEW OF SYSTEMS:    As per HPI. Denies CP or SOB    OBJECTIVE:  VITAL SIGNS:    /89 (BP Location: Left arm)   Pulse 76   Temp 97.6 °F (36.4 °C) (Temporal)   Resp 16   Ht 66\"   Wt 185 lb (83.9 kg)   LMP 01/01/2014 (Approximate)   SpO2 100%   BMI 29.86 kg/m²      PHYSICAL EXAMINATION:  GENERAL EXAM:  GENERAL: Alert and oriented. Well-nourished, normally developed. Thought and emotional status are appropriate, speech is understandable. No acute distress.  HEAD: Normocephalic and atraumatic with normal hair distribution  LUNGS: Normal respiratory effort.  ABDOMEN: Non tender to palpation, tone normal without rigidity or guarding, no masses present, no evidence of hernia.  EXTREMITIES: Without edema, varicosities or lesions.  SKIN: Warm and dry, with good color and turgor. No lesions.    PELVIC EXAM:  External Genitalia: Normal appearance for age. + atrophy, no lesions  Urethra: + atrophy, non tender  Bladder:no fullness, non tender  Vagina: + atrophy. #4 ring pessary removed and cleansed. No vaginal or cervical lesions. Pessary replaced. Tolerated well.  Cervix: no bleeding, no lesions, non tender  Uterus: soft, mobile, non tender  Adnexa:non palpable.  Perineum: non tender  Anus: no hemorrhoids  Rectum: deferred.       ASSESSMENT:    Uterovaginal prolapse, cystocele,  rectocele.     PLAN:    Discussed with patient and her , mgmt options for her symptoms.  She wishes to proceed with surgery.  She will be approached vaginally with hysterectomy, possible BS, USLS, APE repairs, cystoscopy.  No indication for sling at this time. Will continue using pessary until then.       Thorough discussion of surgical risks, benefits, and alternatives including, but not limited to bleeding/clots, infection, injury to nearby organs (urethra, bladder, ureters, bowel, blood vessels), dyspareunia, de jose UUI or BELEN, prolapse recurrence, voiding dysfunction, and pain. Possible need for additional surgeries to address any complications reviewed. Discussed pain mgmt and potential need for narcotics. Discussed addiction potential with narcotics. IL  reviewed.  We reviewed hospital stay and postoperative convalescence.  She understands she will need to go home with a catheter.     All questions answered. IULTD informed consent obtained. She has been scheduled for surgery on 4/8/24 at [facility]. Has been medically cleared for surgery.       Maria L Gloria MD   4/8/2024

## 2024-04-08 NOTE — PLAN OF CARE
Problem: Patient/Family Goals  Goal: Patient/Family Long Term Goal  Description: Patient's Long Term Goal: go home    Interventions:  - IV fluids  -Monitor VS Q 4 hrs  -Monitor labs  - See additional Care Plan goals for specific interventions  Outcome: Not Progressing  Goal: Patient/Family Short Term Goal  Description: Patient's Short Term Goal: have tolerable pain    Interventions:   - give pain meds as needed  - See additional Care Plan goals for specific interventions  Outcome: Progressing     Problem: PAIN - ADULT  Goal: Verbalizes/displays adequate comfort level or patient's stated pain goal  Description: INTERVENTIONS:  - Encourage pt to monitor pain and request assistance  - Assess pain using appropriate pain scale  - Administer analgesics based on type and severity of pain and evaluate response  - Implement non-pharmacological measures as appropriate and evaluate response  - Consider cultural and social influences on pain and pain management  - Manage/alleviate anxiety  - Utilize distraction and/or relaxation techniques  - Monitor for opioid side effects  - Notify MD/LIP if interventions unsuccessful or patient reports new pain  - Anticipate increased pain with activity and pre-medicate as appropriate  Outcome: Progressing     Problem: SAFETY ADULT - FALL  Goal: Free from fall injury  Description: INTERVENTIONS:  - Assess pt frequently for physical needs  - Identify cognitive and physical deficits and behaviors that affect risk of falls.  - [place] fall precautions as indicated by assessment.  - Educate pt/family on patient safety including physical limitations  - Instruct pt to call for assistance with activity based on assessment  - Modify environment to reduce risk of injury  - Provide assistive devices as appropriate  - Consider OT/PT consult to assist with strengthening/mobility  - Encourage toileting schedule  Outcome: Progressing     Problem: GASTROINTESTINAL - ADULT  Goal: Minimal or absence of  nausea and vomiting  Description: INTERVENTIONS:  - Maintain adequate hydration with IV or PO as ordered and tolerated  - Nasogastric tube to low intermittent suction as ordered  - Evaluate effectiveness of ordered antiemetic medications  - Provide nonpharmacologic comfort measures as appropriate  - Advance diet as tolerated, if ordered  - Obtain nutritional consult as needed  - Evaluate fluid balance  Outcome: Progressing  Goal: Maintains or returns to baseline bowel function  Description: INTERVENTIONS:  - Assess bowel function  - Maintain adequate hydration with IV or PO as ordered and tolerated  - Evaluate effectiveness of GI medications  - Encourage mobilization and activity  - Obtain nutritional consult as needed  - Establish a toileting routine/schedule  - Consider collaborating with pharmacy to review patient's medication profile  Outcome: Progressing  Goal: Maintains adequate nutritional intake (undernourished)  Description: INTERVENTIONS:  - Monitor percentage of each meal consumed  - Identify factors contributing to decreased intake, treat as appropriate  - Assist with meals as needed  - Monitor I&O, WT and lab values  - Obtain nutritional consult as needed  - Optimize oral hygiene and moisture  - Encourage food from home; allow for food preferences  - Enhance eating environment  Outcome: Progressing

## 2024-04-09 VITALS
OXYGEN SATURATION: 98 % | RESPIRATION RATE: 16 BRPM | HEIGHT: 66 IN | TEMPERATURE: 98 F | DIASTOLIC BLOOD PRESSURE: 71 MMHG | SYSTOLIC BLOOD PRESSURE: 118 MMHG | HEART RATE: 68 BPM | BODY MASS INDEX: 29.73 KG/M2 | WEIGHT: 185 LBS

## 2024-04-09 LAB
ANION GAP SERPL CALC-SCNC: 4 MMOL/L (ref 0–18)
BUN BLD-MCNC: 10 MG/DL (ref 9–23)
CALCIUM BLD-MCNC: 9.6 MG/DL (ref 8.5–10.1)
CHLORIDE SERPL-SCNC: 109 MMOL/L (ref 98–112)
CO2 SERPL-SCNC: 28 MMOL/L (ref 21–32)
CREAT BLD-MCNC: 0.78 MG/DL
EGFRCR SERPLBLD CKD-EPI 2021: 86 ML/MIN/1.73M2 (ref 60–?)
ERYTHROCYTE [DISTWIDTH] IN BLOOD BY AUTOMATED COUNT: 13.2 %
GLUCOSE BLD-MCNC: 109 MG/DL (ref 70–99)
HCT VFR BLD AUTO: 32.2 %
HGB BLD-MCNC: 10.3 G/DL
MCH RBC QN AUTO: 27.3 PG (ref 26–34)
MCHC RBC AUTO-ENTMCNC: 32 G/DL (ref 31–37)
MCV RBC AUTO: 85.4 FL
OSMOLALITY SERPL CALC.SUM OF ELEC: 292 MOSM/KG (ref 275–295)
PLATELET # BLD AUTO: 189 10(3)UL (ref 150–450)
POTASSIUM SERPL-SCNC: 3.8 MMOL/L (ref 3.5–5.1)
RBC # BLD AUTO: 3.77 X10(6)UL
SODIUM SERPL-SCNC: 141 MMOL/L (ref 136–145)
WBC # BLD AUTO: 8.7 X10(3) UL (ref 4–11)

## 2024-04-09 PROCEDURE — 80048 BASIC METABOLIC PNL TOTAL CA: CPT | Performed by: OBSTETRICS & GYNECOLOGY

## 2024-04-09 PROCEDURE — 85027 COMPLETE CBC AUTOMATED: CPT | Performed by: OBSTETRICS & GYNECOLOGY

## 2024-04-09 RX ORDER — HYDROCODONE BITARTRATE AND ACETAMINOPHEN 5; 325 MG/1; MG/1
1-2 TABLET ORAL EVERY 4 HOURS PRN
Qty: 20 TABLET | Refills: 0 | Status: SHIPPED | OUTPATIENT
Start: 2024-04-09

## 2024-04-09 RX ORDER — IBUPROFEN 600 MG/1
600 TABLET ORAL EVERY 6 HOURS PRN
Qty: 30 TABLET | Refills: 1 | Status: SHIPPED | OUTPATIENT
Start: 2024-04-09

## 2024-04-09 NOTE — PLAN OF CARE
A&Ox4. VSS. RA. . Denies chest pain and SOB.   GI: Abdomen soft, nondistended. Denies passing gas.   Denies nausea.   : Vaca intact, draining clear yellow urine. Pt to DC with vaca.   Pain controlled with scheduled pain medications.   Resting in bed.  at bedside.   Incisions: No incisions. Pt using larry pad. Scant drainage.   Diet: Tolerating regular diet.   IVF running per order.   All appropriate safety measures in place. All questions and concerns addressed.

## 2024-04-09 NOTE — PLAN OF CARE
NURSING DISCHARGE NOTE    Discharged Home via Wheelchair.  Accompanied by Support staff  Belongings Taken by patient/family.    VSS, tolerating diet, pain controlled, tolerating ambulation. Discharge education provided. Fontenot education provided, catheter bag switched, leg bag provided. Peripad and underwear changed. Reviewed medications and follow up appts. All questions answered and concerns addressed, pt verbalized understanding. IV removed. Pt dc in stable condition. Patient left unit via wheelchair at 1235

## 2024-04-09 NOTE — PROGRESS NOTES
63 year old y/o female s/p hyst w/ repairs POD #1  Pt doing well. No complaints. Denies CP or SOB. Pain well controlled with meds. Tolerates PO diet, no N/V. Ambulates without difficulty.     /71 (BP Location: Right arm)   Pulse 70   Temp 98.4 °F (36.9 °C) (Oral)   Resp 16   Ht 66\"   Wt 185 lb (83.9 kg)   LMP 01/01/2014 (Approximate)   SpO2 96%   BMI 29.86 kg/m²   UOP: >2L/overnight  Gen: NAD  Abd: soft, NT/ND  : Fontenot catheter to gravity, no active bleeding  Ext: SCDs b/l, no calf tenderness    Labs: pending      A/P: 63 year old y/o female s/p hyst w/ repairs POD #1  - voiding trial in office, home with catheter  - PO pain meds, IBP & Norco  - home meds as prescribed  - General diet as tolerated  - Ambulate  - bowel mgmt   - Discharge later today  - Follow up as scheduled, sooner as needed    Dr. Stephanie Trammell  [phone]

## 2024-06-15 PROBLEM — L92.9 GRANULATION TISSUE: Status: ACTIVE | Noted: 2024-05-17

## 2024-07-06 ENCOUNTER — HOSPITAL ENCOUNTER (EMERGENCY)
Facility: HOSPITAL | Age: 64
Discharge: HOME | End: 2024-07-06
Attending: EMERGENCY MEDICINE
Payer: COMMERCIAL

## 2024-07-06 VITALS
HEART RATE: 75 BPM | TEMPERATURE: 97 F | OXYGEN SATURATION: 99 % | RESPIRATION RATE: 17 BRPM | SYSTOLIC BLOOD PRESSURE: 119 MMHG | DIASTOLIC BLOOD PRESSURE: 71 MMHG

## 2024-07-06 DIAGNOSIS — R55 SYNCOPE, VASOVAGAL: Primary | ICD-10-CM

## 2024-07-06 LAB
ALBUMIN SERPL-MCNC: 4.6 G/DL (ref 3.2–4.8)
ALBUMIN/GLOB SERPL: 1.5 {RATIO} (ref 1–2)
ALP LIVER SERPL-CCNC: 69 U/L
ALT SERPL-CCNC: 21 U/L
ANION GAP SERPL CALC-SCNC: 8 MMOL/L (ref 0–18)
AST SERPL-CCNC: 17 U/L (ref ?–34)
BASOPHILS # BLD AUTO: 0.02 X10(3) UL (ref 0–0.2)
BASOPHILS NFR BLD AUTO: 0.3 %
BILIRUB SERPL-MCNC: 0.5 MG/DL (ref 0.2–1.1)
BUN BLD-MCNC: 17 MG/DL (ref 9–23)
BUN/CREAT SERPL: 16.3 (ref 10–20)
CALCIUM BLD-MCNC: 10.3 MG/DL (ref 8.7–10.4)
CHLORIDE SERPL-SCNC: 109 MMOL/L (ref 98–112)
CO2 SERPL-SCNC: 25 MMOL/L (ref 21–32)
CREAT BLD-MCNC: 1.04 MG/DL
DEPRECATED RDW RBC AUTO: 41.7 FL (ref 35.1–46.3)
EGFRCR SERPLBLD CKD-EPI 2021: 61 ML/MIN/1.73M2 (ref 60–?)
EOSINOPHIL # BLD AUTO: 0.06 X10(3) UL (ref 0–0.7)
EOSINOPHIL NFR BLD AUTO: 0.8 %
ERYTHROCYTE [DISTWIDTH] IN BLOOD BY AUTOMATED COUNT: 13.3 % (ref 11–15)
GLOBULIN PLAS-MCNC: 3.1 G/DL (ref 2–3.5)
GLUCOSE BLD-MCNC: 145 MG/DL (ref 70–99)
GLUCOSE BLDC GLUCOMTR-MCNC: 144 MG/DL (ref 70–99)
HCT VFR BLD AUTO: 35.2 %
HGB BLD-MCNC: 11.3 G/DL
IMM GRANULOCYTES # BLD AUTO: 0.01 X10(3) UL (ref 0–1)
IMM GRANULOCYTES NFR BLD: 0.1 %
LYMPHOCYTES # BLD AUTO: 4.21 X10(3) UL (ref 1–4)
LYMPHOCYTES NFR BLD AUTO: 54.7 %
MCH RBC QN AUTO: 27.4 PG (ref 26–34)
MCHC RBC AUTO-ENTMCNC: 32.1 G/DL (ref 31–37)
MCV RBC AUTO: 85.2 FL
MONOCYTES # BLD AUTO: 0.36 X10(3) UL (ref 0.1–1)
MONOCYTES NFR BLD AUTO: 4.7 %
NEUTROPHILS # BLD AUTO: 3.04 X10 (3) UL (ref 1.5–7.7)
NEUTROPHILS # BLD AUTO: 3.04 X10(3) UL (ref 1.5–7.7)
NEUTROPHILS NFR BLD AUTO: 39.4 %
OSMOLALITY SERPL CALC.SUM OF ELEC: 298 MOSM/KG (ref 275–295)
PLATELET # BLD AUTO: 214 10(3)UL (ref 150–450)
POTASSIUM SERPL-SCNC: 4 MMOL/L (ref 3.5–5.1)
PROT SERPL-MCNC: 7.7 G/DL (ref 5.7–8.2)
RBC # BLD AUTO: 4.13 X10(6)UL
SODIUM SERPL-SCNC: 142 MMOL/L (ref 136–145)
WBC # BLD AUTO: 7.7 X10(3) UL (ref 4–11)

## 2024-07-06 PROCEDURE — 99283 EMERGENCY DEPT VISIT LOW MDM: CPT

## 2024-07-06 PROCEDURE — 82962 GLUCOSE BLOOD TEST: CPT

## 2024-07-06 PROCEDURE — 93005 ELECTROCARDIOGRAM TRACING: CPT

## 2024-07-06 PROCEDURE — 99284 EMERGENCY DEPT VISIT MOD MDM: CPT

## 2024-07-06 PROCEDURE — 80053 COMPREHEN METABOLIC PANEL: CPT | Performed by: EMERGENCY MEDICINE

## 2024-07-06 PROCEDURE — 36415 COLL VENOUS BLD VENIPUNCTURE: CPT

## 2024-07-06 PROCEDURE — 93010 ELECTROCARDIOGRAM REPORT: CPT

## 2024-07-06 PROCEDURE — 85025 COMPLETE CBC W/AUTO DIFF WBC: CPT | Performed by: EMERGENCY MEDICINE

## 2024-07-07 LAB
ATRIAL RATE: 70 BPM
P AXIS: 63 DEGREES
P-R INTERVAL: 188 MS
Q-T INTERVAL: 436 MS
QRS DURATION: 104 MS
QTC CALCULATION (BEZET): 470 MS
R AXIS: 32 DEGREES
T AXIS: 26 DEGREES
VENTRICULAR RATE: 70 BPM

## 2024-07-07 NOTE — ED INITIAL ASSESSMENT (HPI)
Pt to ED via [facility] after two syncopal episodes today. Pt denies hitting her head. Pts family stated that she only had breakfast today.     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

## 2024-07-07 NOTE — ED PROVIDER NOTES
Patient Seen in: [facility] Emergency Department    History     Chief Complaint   Patient presents with    Syncope       HPI    The patient presents to the ED via EMS after having 2 syncopal episodes this evening.  The patient's son states that she was staying in a community event and had only had breakfast this morning as well as very little to drink.  She did not fall, was lowered to the ground by family and had worms or vomiting.  Patient presents with ongoing dizziness and nausea.  History of syncope before.    History reviewed.   Past Medical History:    Diverticulosis    Diverticulosis large intestine w/o perforation or abscess w/o bleeding    Dry eye    Schirmer's test 6mm OU    Epiphora, bilateral    OD-total blockage, OS partial blockage    Hemorrhoids    High blood pressure    Internal hemorrhoids    MGD (meibomian gland dysfunction)       History reviewed.   Past Surgical History:   Procedure Laterality Date    Colonoscopy  2012    Colonoscopy N/A 01/16/2018    Procedure: COLONOSCOPY;  Surgeon: Sharlene De La Torre MD;  Location: [facility] ENDO    Lipoma removal      Total abdominal hysterectomy           Medications :  (Not in a hospital admission)       Family History   Problem Relation Age of Onset    Colon Cancer Father 77    Stroke Father 78    Cancer Father 80        colon    Other (Other) Father 90        unknown cause of death    Diabetes Mother         unknown       Smoking Status:   Social History     Socioeconomic History    Marital status:    Tobacco Use    Smoking status: Never    Smokeless tobacco: Never   Vaping Use    Vaping status: Never Used   Substance and Sexual Activity    Alcohol use: Never     Alcohol/week: 0.0 standard drinks of alcohol    Drug use: Never    Sexual activity: Not Currently     Partners: Male   Other Topics Concern    Caffeine Concern No    Exercise Yes     Comment: 30 min walking daily       Constitutional and vital signs reviewed.      Social  History and Family History elements reviewed from today, pertinent positives to the presenting problem noted.    Physical Exam     ED Triage Vitals   BP 07/06/24 2021 (!) 78/46   Pulse 07/06/24 2021 55   Resp 07/06/24 2024 15   Temp 07/06/24 2021 97.4 °F (36.3 °C)   Temp src 07/06/24 2021 Temporal   SpO2 07/06/24 2024 97 %   O2 Device 07/06/24 2028 None (Room air)       All measures to prevent infection transmission during my interaction with the patient were taken. Handwashing was performed prior to and after the exam.  Stethoscope and any equipment used during my examination was cleaned with super sani-cloth germicidal wipes following the exam.     Physical Exam  Vitals and nursing note reviewed.   Constitutional:       General: She is not in acute distress.     Appearance: She is well-developed. She is not ill-appearing or toxic-appearing.   HENT:      Head: Normocephalic and atraumatic.   Eyes:      General:         Right eye: No discharge.         Left eye: No discharge.      Conjunctiva/sclera: Conjunctivae normal.   Neck:      Trachea: No tracheal deviation.   Cardiovascular:      Rate and Rhythm: Normal rate and regular rhythm.   Pulmonary:      Effort: Pulmonary effort is normal. No respiratory distress.      Breath sounds: Normal breath sounds. No stridor.   Abdominal:      General: There is no distension.      Palpations: Abdomen is soft.   Musculoskeletal:         General: No deformity.   Skin:     General: Skin is warm and dry.   Neurological:      Mental Status: She is alert and oriented to person, place, and time.   Psychiatric:         Mood and Affect: Mood normal.         Behavior: Behavior normal.         ED Course        Labs Reviewed   COMP METABOLIC PANEL (14) - Abnormal; Notable for the following components:       Result Value    Glucose 145 (*)     Creatinine 1.04 (*)     Calculated Osmolality 298 (*)     All other components within normal limits   POCT GLUCOSE - Abnormal; Notable for the  following components:    POC Glucose  144 (*)     All other components within normal limits   CBC W/ DIFFERENTIAL - Abnormal; Notable for the following components:    HGB 11.3 (*)     Lymphocyte Absolute 4.21 (*)     All other components within normal limits   CBC WITH DIFFERENTIAL WITH PLATELET    Narrative:     The following orders were created for panel order CBC With Differential With Platelet.                  Procedure                               Abnormality         Status                                     ---------                               -----------         ------                                     CBC W/ DIFFERENTIAL[[id number]]          Abnormal            Final result                                                 Please view results for these tests on the individual orders.   RAINBOW DRAW LAVENDER   RAINBOW DRAW LIGHT GREEN   RAINBOW DRAW BLUE     EKG    Rate, intervals and axes as noted on EKG Report.  Rate: Normal, 70 bpm  Rhythm: Sinus Rhythm  Reading: Normal intervals, normal ST segments, normal EKG           As Interpreted by me    Imaging Results Available and Reviewed while in ED: No results found.  ED Medications Administered: Medications - No data to display      MDM     Vitals:    07/06/24 2028 07/06/24 2045 07/06/24 2115 07/06/24 2215   BP: 110/66 100/66 105/61 119/71   Pulse: 74 71 70 75   Resp: 17 16 16 17   Temp:       TempSrc:       SpO2: 93% 99% 100% 99%     *I personally reviewed and interpreted all ED vitals.    Pulse Ox: 99%, Room air, Normal     Monitor Interpretation: Normal sinus rhythm as interpreted by me.  The cardiac monitor was ordered to monitor heart rate and monitor for arrhythmia.    Differential Diagnosis/ Diagnostic Considerations: Vasovagal syncope, anemia, arrhythmia, other    Complicating Factors: The patient already has does not have any pertinent problems on file. to contribute to the complexity of this ED evaluation.    Medical Decision Making  Patient  presents to the ED after having 2 syncopal episodes tonight.  Bradycardic arrival and vomited x 1 and then vital signs improved.  Patient feeling much improved on reevaluation and states no further symptoms.  Laboratory testing, EKG and cardiac monitor without concerning findings.  Suspect vasovagal cause of syncope.  Stable for discharge home with family.    Problems Addressed:  Syncope, vasovagal: acute illness or injury that poses a threat to life or bodily functions    Amount and/or Complexity of Data Reviewed  Independent Historian:      Details: The patient's son provides history details  Labs: ordered. Decision-making details documented in ED Course.  ECG/medicine tests: ordered and independent interpretation performed. Decision-making details documented in ED Course.        Condition upon leaving the department: Stable    Disposition and Plan     Clinical Impression:  1. Syncope, vasovagal        Disposition:  Discharge    Follow-up:  Hernan Mendoza MD  [address] [place] [id number]  [phone]    Schedule an appointment as soon as possible for a visit in 3 day(s)        Medications Prescribed:  Discharge Medication List as of 7/6/2024 10:22 PM

## 2024-09-28 ENCOUNTER — LAB ENCOUNTER (OUTPATIENT)
Dept: LAB | Facility: HOSPITAL | Age: 64
End: 2024-09-28
Attending: INTERNAL MEDICINE
Payer: COMMERCIAL

## 2024-09-28 DIAGNOSIS — D64.9 ANEMIA, UNSPECIFIED TYPE: ICD-10-CM

## 2024-09-28 DIAGNOSIS — Z00.00 ROUTINE GENERAL MEDICAL EXAMINATION AT A HEALTH CARE FACILITY: ICD-10-CM

## 2024-09-28 DIAGNOSIS — E55.9 VITAMIN D DEFICIENCY: ICD-10-CM

## 2024-09-28 DIAGNOSIS — E03.9 PRIMARY HYPOTHYROIDISM: ICD-10-CM

## 2024-09-28 DIAGNOSIS — E78.2 MIXED HYPERLIPIDEMIA: ICD-10-CM

## 2024-09-28 DIAGNOSIS — R73.9 HYPERGLYCEMIA: ICD-10-CM

## 2024-09-28 LAB
ALBUMIN SERPL-MCNC: 4.5 G/DL (ref 3.2–4.8)
ALBUMIN/GLOB SERPL: 1.2 {RATIO} (ref 1–2)
ALP LIVER SERPL-CCNC: 86 U/L
ALT SERPL-CCNC: 19 U/L
ANION GAP SERPL CALC-SCNC: 4 MMOL/L (ref 0–18)
AST SERPL-CCNC: 18 U/L (ref ?–34)
BASOPHILS # BLD AUTO: 0.02 X10(3) UL (ref 0–0.2)
BASOPHILS NFR BLD AUTO: 0.3 %
BILIRUB SERPL-MCNC: 0.7 MG/DL (ref 0.2–1.1)
BUN BLD-MCNC: 9 MG/DL (ref 9–23)
CALCIUM BLD-MCNC: 10.7 MG/DL (ref 8.7–10.4)
CHLORIDE SERPL-SCNC: 105 MMOL/L (ref 98–112)
CHOLEST SERPL-MCNC: 210 MG/DL (ref ?–200)
CO2 SERPL-SCNC: 29 MMOL/L (ref 21–32)
CREAT BLD-MCNC: 0.75 MG/DL
EGFRCR SERPLBLD CKD-EPI 2021: 89 ML/MIN/1.73M2 (ref 60–?)
EOSINOPHIL # BLD AUTO: 0.07 X10(3) UL (ref 0–0.7)
EOSINOPHIL NFR BLD AUTO: 1.2 %
ERYTHROCYTE [DISTWIDTH] IN BLOOD BY AUTOMATED COUNT: 12.9 %
EST. AVERAGE GLUCOSE BLD GHB EST-MCNC: 134 MG/DL (ref 68–126)
FASTING PATIENT LIPID ANSWER: YES
FASTING STATUS PATIENT QL REPORTED: YES
GLOBULIN PLAS-MCNC: 3.7 G/DL (ref 2–3.5)
GLUCOSE BLD-MCNC: 103 MG/DL (ref 70–99)
HBA1C MFR BLD: 6.3 % (ref ?–5.7)
HCT VFR BLD AUTO: 36.1 %
HDLC SERPL-MCNC: 44 MG/DL (ref 40–59)
HGB BLD-MCNC: 11.8 G/DL
IMM GRANULOCYTES # BLD AUTO: 0.01 X10(3) UL (ref 0–1)
IMM GRANULOCYTES NFR BLD: 0.2 %
LDLC SERPL CALC-MCNC: 145 MG/DL (ref ?–100)
LYMPHOCYTES # BLD AUTO: 3 X10(3) UL (ref 1–4)
LYMPHOCYTES NFR BLD AUTO: 52.1 %
MCH RBC QN AUTO: 27.3 PG (ref 26–34)
MCHC RBC AUTO-ENTMCNC: 32.7 G/DL (ref 31–37)
MCV RBC AUTO: 83.6 FL
MONOCYTES # BLD AUTO: 0.31 X10(3) UL (ref 0.1–1)
MONOCYTES NFR BLD AUTO: 5.4 %
NEUTROPHILS # BLD AUTO: 2.35 X10 (3) UL (ref 1.5–7.7)
NEUTROPHILS # BLD AUTO: 2.35 X10(3) UL (ref 1.5–7.7)
NEUTROPHILS NFR BLD AUTO: 40.8 %
NONHDLC SERPL-MCNC: 166 MG/DL (ref ?–130)
OSMOLALITY SERPL CALC.SUM OF ELEC: 285 MOSM/KG (ref 275–295)
PLATELET # BLD AUTO: 222 10(3)UL (ref 150–450)
POTASSIUM SERPL-SCNC: 4.4 MMOL/L (ref 3.5–5.1)
PROT SERPL-MCNC: 8.2 G/DL (ref 5.7–8.2)
RBC # BLD AUTO: 4.32 X10(6)UL
SODIUM SERPL-SCNC: 138 MMOL/L (ref 136–145)
TRIGL SERPL-MCNC: 118 MG/DL (ref 30–149)
TSI SER-ACNC: 1.05 MIU/ML (ref 0.55–4.78)
VIT D+METAB SERPL-MCNC: 58.6 NG/ML (ref 30–100)
VLDLC SERPL CALC-MCNC: 22 MG/DL (ref 0–30)
WBC # BLD AUTO: 5.8 X10(3) UL (ref 4–11)

## 2024-09-28 PROCEDURE — 80053 COMPREHEN METABOLIC PANEL: CPT

## 2024-09-28 PROCEDURE — 36415 COLL VENOUS BLD VENIPUNCTURE: CPT

## 2024-09-28 PROCEDURE — 84443 ASSAY THYROID STIM HORMONE: CPT

## 2024-09-28 PROCEDURE — 83036 HEMOGLOBIN GLYCOSYLATED A1C: CPT

## 2024-09-28 PROCEDURE — 82306 VITAMIN D 25 HYDROXY: CPT

## 2024-09-28 PROCEDURE — 85025 COMPLETE CBC W/AUTO DIFF WBC: CPT

## 2024-09-28 PROCEDURE — 80061 LIPID PANEL: CPT

## 2024-10-28 ENCOUNTER — TELEPHONE (OUTPATIENT)
Dept: UROLOGY | Facility: CLINIC | Age: 64
End: 2024-10-28

## 2024-11-27 ENCOUNTER — OFFICE VISIT (OUTPATIENT)
Dept: UROLOGY | Facility: CLINIC | Age: 64
End: 2024-11-27
Attending: OBSTETRICS & GYNECOLOGY
Payer: COMMERCIAL

## 2024-11-27 VITALS — RESPIRATION RATE: 17 BRPM | HEIGHT: 66 IN | BODY MASS INDEX: 29 KG/M2 | TEMPERATURE: 98 F

## 2024-11-27 DIAGNOSIS — N81.89 PELVIC FLOOR WEAKNESS: ICD-10-CM

## 2024-11-27 DIAGNOSIS — N95.2 VAGINAL ATROPHY: Primary | ICD-10-CM

## 2024-11-27 PROCEDURE — 99212 OFFICE O/P EST SF 10 MIN: CPT

## 2024-11-27 NOTE — PROGRESS NOTES
Laurie Gracia  7/31/1960  Encounter date: 11/27/24    Chief Complaint   Patient presents with    Post-Op     6 Month Post-Op       HPI:  64 year old female who is status post TVH, USLS, APE repairs, cysto on 4/8/24. She was discharged home on POD#1 with Fontenot catheter. She passed her voiding trial. Last seen on 5/17/24 at the [facility] office for 6-week post op exam. Had normal exam.  Returns for follow up.    Interval history:  Continues to do well. Denies bugle symptoms. Voiding every 2-3 hours during the day and once at night.  No BELEN or UUI. Emptying well.  No UTI symptoms. Bowels are regular.  Not sexually active at this time.     Objective:  Temp 97.7 °F (36.5 °C)   Resp 17   Ht 66\"   LMP 01/01/2014 (Approximate)   BMI 28.89 kg/m²     Gen: Alert and oriented, no acute distress  Respiratory: Normal respiratory effort  Abdomen: non-tender, non distended. No rebound or guarding.   Pelvic:  Cough stress test: Negative in the supine position.  External genitalia: Normal for age.   Vagina: + Atrophy.  Incisions: Well healed.   Support: Excellent, no residual prolapse noted.   Tenderness: non   Pelvic floor muscle strength: 0/5    Impression:  Encounter Diagnoses   Name Primary?    Vaginal atrophy Yes    Pelvic floor weakness          Plan:  Patient continues to do well.  Had good surgical results. Not bothered by atrophy.  Declines vaginal estrogen at this time. Follow up on 4/2025 for one year post op visit or sooner prn.     Maria L Gloria MD, FACOG, FACS  Female Pelvic Medicine and  Reconstructive Surgery (Urogynecology)

## 2025-08-02 ENCOUNTER — LAB ENCOUNTER (OUTPATIENT)
Dept: LAB | Facility: HOSPITAL | Age: 65
End: 2025-08-02
Attending: INTERNAL MEDICINE

## 2025-08-02 DIAGNOSIS — E03.9 PRIMARY HYPOTHYROIDISM: ICD-10-CM

## 2025-08-02 DIAGNOSIS — R73.9 BLOOD GLUCOSE ELEVATED: ICD-10-CM

## 2025-08-02 DIAGNOSIS — D64.9 ANEMIA, UNSPECIFIED TYPE: ICD-10-CM

## 2025-08-02 DIAGNOSIS — R35.1 NOCTURIA: ICD-10-CM

## 2025-08-02 DIAGNOSIS — E78.5 HYPERLIPIDEMIA, UNSPECIFIED HYPERLIPIDEMIA TYPE: ICD-10-CM

## 2025-08-02 LAB
ALBUMIN SERPL-MCNC: 4.7 G/DL (ref 3.2–4.8)
ALBUMIN/GLOB SERPL: 1.5 (ref 1–2)
ALP LIVER SERPL-CCNC: 75 U/L (ref 50–130)
ALT SERPL-CCNC: 15 U/L (ref 10–49)
ANION GAP SERPL CALC-SCNC: 5 MMOL/L (ref 0–18)
AST SERPL-CCNC: 17 U/L (ref ?–34)
BASOPHILS # BLD AUTO: 0.02 X10(3) UL (ref 0–0.2)
BASOPHILS NFR BLD AUTO: 0.3 %
BILIRUB SERPL-MCNC: 0.6 MG/DL (ref 0.2–1.1)
BUN BLD-MCNC: 9 MG/DL (ref 9–23)
CALCIUM BLD-MCNC: 9.7 MG/DL (ref 8.7–10.6)
CHLORIDE SERPL-SCNC: 104 MMOL/L (ref 98–112)
CHOLEST SERPL-MCNC: 224 MG/DL (ref ?–200)
CO2 SERPL-SCNC: 27 MMOL/L (ref 21–32)
CREAT BLD-MCNC: 0.74 MG/DL (ref 0.55–1.02)
EGFRCR SERPLBLD CKD-EPI 2021: 90 ML/MIN/1.73M2 (ref 60–?)
EOSINOPHIL # BLD AUTO: 0.06 X10(3) UL (ref 0–0.7)
EOSINOPHIL NFR BLD AUTO: 0.8 %
ERYTHROCYTE [DISTWIDTH] IN BLOOD BY AUTOMATED COUNT: 12.9 %
EST. AVERAGE GLUCOSE BLD GHB EST-MCNC: 131 MG/DL (ref 68–126)
FASTING PATIENT LIPID ANSWER: YES
FASTING STATUS PATIENT QL REPORTED: YES
GLOBULIN PLAS-MCNC: 3.1 G/DL (ref 2–3.5)
GLUCOSE BLD-MCNC: 102 MG/DL (ref 70–99)
HBA1C MFR BLD: 6.2 % (ref ?–5.7)
HCT VFR BLD AUTO: 37 % (ref 35–48)
HDLC SERPL-MCNC: 50 MG/DL (ref 40–59)
HGB BLD-MCNC: 11.7 G/DL (ref 12–16)
IMM GRANULOCYTES # BLD AUTO: 0.01 X10(3) UL (ref 0–1)
IMM GRANULOCYTES NFR BLD: 0.1 %
LDLC SERPL CALC-MCNC: 155 MG/DL (ref ?–100)
LYMPHOCYTES # BLD AUTO: 2.49 X10(3) UL (ref 1–4)
LYMPHOCYTES NFR BLD AUTO: 32.4 %
MCH RBC QN AUTO: 27.3 PG (ref 26–34)
MCHC RBC AUTO-ENTMCNC: 31.6 G/DL (ref 31–37)
MCV RBC AUTO: 86.4 FL (ref 80–100)
MONOCYTES # BLD AUTO: 0.36 X10(3) UL (ref 0.1–1)
MONOCYTES NFR BLD AUTO: 4.7 %
NEUTROPHILS # BLD AUTO: 4.74 X10 (3) UL (ref 1.5–7.7)
NEUTROPHILS # BLD AUTO: 4.74 X10(3) UL (ref 1.5–7.7)
NEUTROPHILS NFR BLD AUTO: 61.7 %
NONHDLC SERPL-MCNC: 174 MG/DL (ref ?–130)
OSMOLALITY SERPL CALC.SUM OF ELEC: 281 MOSM/KG (ref 275–295)
PLATELET # BLD AUTO: 206 10(3)UL (ref 150–450)
POTASSIUM SERPL-SCNC: 4.2 MMOL/L (ref 3.5–5.1)
PROT SERPL-MCNC: 7.8 G/DL (ref 5.7–8.2)
PSA SERPL-MCNC: <0.04 NG/ML (ref ?–4)
RBC # BLD AUTO: 4.28 X10(6)UL (ref 3.8–5.3)
SODIUM SERPL-SCNC: 136 MMOL/L (ref 136–145)
TRIGL SERPL-MCNC: 108 MG/DL (ref 30–149)
TSI SER-ACNC: 0.95 UIU/ML (ref 0.55–4.78)
VLDLC SERPL CALC-MCNC: 21 MG/DL (ref 0–30)
WBC # BLD AUTO: 7.7 X10(3) UL (ref 4–11)

## 2025-08-02 PROCEDURE — 85025 COMPLETE CBC W/AUTO DIFF WBC: CPT

## 2025-08-02 PROCEDURE — 36415 COLL VENOUS BLD VENIPUNCTURE: CPT

## 2025-08-02 PROCEDURE — 84443 ASSAY THYROID STIM HORMONE: CPT

## 2025-08-02 PROCEDURE — 80053 COMPREHEN METABOLIC PANEL: CPT

## 2025-08-02 PROCEDURE — 84153 ASSAY OF PSA TOTAL: CPT

## 2025-08-02 PROCEDURE — 83036 HEMOGLOBIN GLYCOSYLATED A1C: CPT

## 2025-08-02 PROCEDURE — 80061 LIPID PANEL: CPT

## 2025-08-09 ENCOUNTER — HOSPITAL ENCOUNTER (OUTPATIENT)
Dept: MAMMOGRAPHY | Age: 65
Discharge: HOME | End: 2025-08-09
Attending: INTERNAL MEDICINE

## 2025-08-09 DIAGNOSIS — Z12.31 VISIT FOR SCREENING MAMMOGRAM: ICD-10-CM

## 2025-08-09 PROCEDURE — 77063 BREAST TOMOSYNTHESIS BI: CPT | Performed by: INTERNAL MEDICINE

## 2025-08-09 PROCEDURE — 77067 SCR MAMMO BI INCL CAD: CPT | Performed by: INTERNAL MEDICINE

## (undated) DEVICE — ENDOSCOPY PACK - LOWER: Brand: MEDLINE INDUSTRIES, INC.

## (undated) DEVICE — GLOVE SUR 6.5 SENSICARE PI PIP CRM PWD F

## (undated) DEVICE — SUT VCRL 0 L27IN ABSRB VLT L36MM CT-1 1/2

## (undated) DEVICE — VIOLET BRAIDED (POLYGLACTIN 910), SYNTHETIC ABSORBABLE SUTURE: Brand: COATED VICRYL

## (undated) DEVICE — SUT COAT VCRL 2-0 27IN ABSRB VLT 36MM CT-1

## (undated) DEVICE — #10 STERILE BLADE: Brand: POLYMER COATED BLADES

## (undated) DEVICE — SNARE CAPTI HEX STIFF MEDIUM

## (undated) DEVICE — SOLUTION IRRIG 1000ML 0.9% NACL USP BTL

## (undated) DEVICE — MEDI-VAC NON-CONDUCTIVE SUCTION TUBING 6MM X 1.8M (6FT.) L: Brand: CARDINAL HEALTH

## (undated) DEVICE — 35 ML SYRINGE REGULAR TIP: Brand: MONOJECT

## (undated) DEVICE — SUT VCRL 0 18IN CT-1 ABSRB VLT CR L36MM 1/2

## (undated) DEVICE — STANDARD HYPODERMIC NEEDLE,POLYPROPYLENE HUB: Brand: MONOJECT

## (undated) DEVICE — #15 STERILE STAINLESS BLADE: Brand: STERILE STAINLESS BLADES

## (undated) DEVICE — STERILE H2O FOR IRRIG .

## (undated) DEVICE — FORCEP RADIAL JAW 4

## (undated) DEVICE — LAPAROTOMY SPONGE - RF AND X-RAY DETECTABLE PRE-WASHED: Brand: SITUATE

## (undated) DEVICE — SET CYSTO IRRIG L77IN DIA0.241IN BLDR NVENT

## (undated) DEVICE — 3 ML SYRINGE LUER-LOCK TIP: Brand: MONOJECT

## (undated) DEVICE — SUT VCRL 2-0 36IN CT-1 ABSRB UD L36MM 1/2 CIR

## (undated) DEVICE — Device: Brand: DEFENDO AIR/WATER/SUCTION AND BIOPSY VALVE

## (undated) DEVICE — SLEEVE COMPR MD KNEE LEN SGL USE KENDALL SCD

## (undated) DEVICE — COVER,MAYO STAND,STERILE: Brand: MEDLINE

## (undated) DEVICE — Device: Brand: CUSTOM PROCEDURE KIT

## (undated) DEVICE — 6 ML SYRINGE LUER-LOCK TIP: Brand: MONOJECT

## (undated) DEVICE — HOOK RETRCT 5MM SHRP E STAY DISP LONE STAR

## (undated) DEVICE — PENCIL SMK EVAC L10FT MPLR BLDE JAW OPN

## (undated) DEVICE — CONMED SCOPE SAVER BITE BLOCK, 20X27 MM: Brand: SCOPE SAVER

## (undated) DEVICE — MEDI-VAC NON-CONDUCTIVE SUCTION TUBING: Brand: CARDINAL HEALTH

## (undated) DEVICE — GYN CDS: Brand: MEDLINE INDUSTRIES, INC.

## (undated) DEVICE — TRAY CATH 16FR F INCL BARDX IC COMPLT CARE

## (undated) DEVICE — LINE MNTR ADLT SET O2 INTMD

## (undated) NOTE — MR AVS SNAPSHOT
[facility] [place] - [place] [address] [address]  [phone]               Thank you for choosing us for your health care visit with Julio C Gomez MD.  We are glad to serve you and happy to provide you with this summary of yo This list is accurate as of: 2/1/17  1:09 PM.  Always use your most recent med list.                KP ASPIRIN 81 MG Tbec   Generic drug:  aspirin   Take 81 mg by mouth daily.                    Today's Orders     US PELVIS EV (TRNS ABD AND ENDOVAG) (CPT=76 Enter your [employer] Activation Code exactly as it appears below along with your Zip Code and Date of Birth to complete the sign-up process. If you do not sign up before the expiration date, you must request a new code.     Your unique [employer] Access Code: Visit [facility] online at  [email or web address]

## (undated) NOTE — LETTER
02/06/20        Alexandra Parent  [address]      Dear Holmes Ormond,    [facility] records indicate that you have outstanding lab work and or testing that was ordered for you and has not yet been completed:  Orders Placed This Encounter      Ir

## (undated) NOTE — LETTER
December 18, 2017    [place] [address]      Dear Cruz Dubois: The following are the results of your recent tests. Please review the list of test results.   Your result is the value on the left; we have also supplied the ran LDL Cholesterol 126 (H) 0 - 99 mg/dL   -VITAMIN D, 25-HYDROXY   Result Value Ref Range   Vitamin D, 25OH, Total 13.1 ng/mL   -VITAMIN B12   Result Value Ref Range   Vitamin B12 309 181 - 914 pg/mL   -ASSAY, THYROID STIM HORMONE   Result Value Ref Range   T

## (undated) NOTE — LETTER
06/09/21        Navin Rist  [address] [address]      Dear Kylah Javier,        As you know, regular medical attention is essential to maintaining your health.   Your request refill for  Pantoprazole will be  authorized and will be given to y

## (undated) NOTE — LETTER
08/15/18        Paras Villagran  [address]      Dear Cruz Dubois,    [facility] records indicate that you have outstanding lab work and or testing that was ordered for you and has not yet been completed:          Comp Metabolic Panel (14) [E

## (undated) NOTE — LETTER
03/26/19              [facility]      Dear Kati Simpson,     We are contacting you from Dr. Jenaro Bailey office. Your health is important to us.  We have not received test results for additional tests that your provider rec

## (undated) NOTE — LETTER
[facility]  Administration of Anesthesia  1. Juan Sesay, or _________________________________ acting on her behalf, (Patient) (Dependent/Representative) request to receive anesthesia for my pending procedure/operation/treatment.   NIKOLAI russell infections, high spinal block, spinal bleeding, seizure, cardiac arrest and death. 7. AWARENESS: I understand that it is possible (but unlikely) to have explicit memory of events from the operating room while under general anesthesia.   8. ELECTROCONVULSIV (Date) (Time)                                                                                               (Responsible person in case of minor/ unconscious pt) /Relationship    My signature below affirms that prior to the time of the procedure, I have ex

## (undated) NOTE — LETTER
[address] [place] [address]  Authorization for Invasive Procedures  1.  I hereby authorize Dr. Althea Harris , my physician and whomever may be designated as the doctor's assistant, to perform the following operation and/or procedure:  Colonoscopy performed for the purposes of advancing medicine, science, and/or education, provided my identity is not revealed. If the procedure has been videotaped, the physician/surgeon will obtain the original videotape.  The hospital will not be responsible for stor My signature below affirms that prior to the time of the procedure, I have explained to the patient and/or her legal representative, the risks and benefits involved in the proposed treatment and any reasonable alternative to the proposed treatment.  I have

## (undated) NOTE — LETTER
11/1/2022    Pooja Metcalf        [address]        [address] [id number]            Dear Pooja Metcalf,      Our records indicate that you are due for an appointment for a Colonoscopy with Yuliya Og MD. Our doctors are booking out about 3-5 months for procedures. Please call our office to schedule a phone screening appointment to plan for the procedure(s). Your medical well-being is important to us. If your insurance requires a referral, please call your primary care office to request one.       Thank you,      The Physicians and Staff at [facility]

## (undated) NOTE — LETTER
09/26/18        Neeta Wagner  [address]      Dear Vicky Blake,    Our records indicate that you have outstanding lab work and or testing that was ordered for you and has not yet been completed:  Orders Placed This Encounter      Co

## (undated) NOTE — LETTER
10/01/21        Cassandra Staley  [address] [address]      Dear Pushpa Meyer,      As you know, regular medical attention is essential to maintaining your health.   Your request refill for your Pantoprazole will be  authorized and will be sending